# Patient Record
Sex: MALE | Race: BLACK OR AFRICAN AMERICAN | NOT HISPANIC OR LATINO | URBAN - METROPOLITAN AREA
[De-identification: names, ages, dates, MRNs, and addresses within clinical notes are randomized per-mention and may not be internally consistent; named-entity substitution may affect disease eponyms.]

---

## 2024-04-15 ENCOUNTER — APPOINTMENT (OUTPATIENT)
Dept: NEUROLOGY | Facility: CLINIC | Age: 54
End: 2024-04-15

## 2024-07-03 ENCOUNTER — HOSPITAL ENCOUNTER (EMERGENCY)
Facility: HOSPITAL | Age: 54
Discharge: HOME | End: 2024-07-03
Attending: STUDENT IN AN ORGANIZED HEALTH CARE EDUCATION/TRAINING PROGRAM
Payer: COMMERCIAL

## 2024-07-03 VITALS
OXYGEN SATURATION: 97 % | DIASTOLIC BLOOD PRESSURE: 86 MMHG | WEIGHT: 220 LBS | RESPIRATION RATE: 18 BRPM | SYSTOLIC BLOOD PRESSURE: 137 MMHG | BODY MASS INDEX: 25.98 KG/M2 | TEMPERATURE: 97.7 F | HEIGHT: 77 IN | HEART RATE: 78 BPM

## 2024-07-03 DIAGNOSIS — F19.90 SUBSTANCE USE: Primary | ICD-10-CM

## 2024-07-03 LAB — GLUCOSE BLD MANUAL STRIP-MCNC: 127 MG/DL (ref 74–99)

## 2024-07-03 PROCEDURE — 99284 EMERGENCY DEPT VISIT MOD MDM: CPT | Performed by: STUDENT IN AN ORGANIZED HEALTH CARE EDUCATION/TRAINING PROGRAM

## 2024-07-03 PROCEDURE — 2500000001 HC RX 250 WO HCPCS SELF ADMINISTERED DRUGS (ALT 637 FOR MEDICARE OP): Mod: SE | Performed by: NURSE PRACTITIONER

## 2024-07-03 PROCEDURE — 82947 ASSAY GLUCOSE BLOOD QUANT: CPT

## 2024-07-03 PROCEDURE — 2500000002 HC RX 250 W HCPCS SELF ADMINISTERED DRUGS (ALT 637 FOR MEDICARE OP, ALT 636 FOR OP/ED)

## 2024-07-03 PROCEDURE — 99283 EMERGENCY DEPT VISIT LOW MDM: CPT

## 2024-07-03 RX ORDER — IBUPROFEN 600 MG/1
600 TABLET ORAL ONCE
Status: COMPLETED | OUTPATIENT
Start: 2024-07-03 | End: 2024-07-03

## 2024-07-03 RX ORDER — DIAZEPAM 5 MG/1
5 TABLET ORAL ONCE
Status: COMPLETED | OUTPATIENT
Start: 2024-07-03 | End: 2024-07-03

## 2024-07-03 RX ADMIN — IBUPROFEN 600 MG: 600 TABLET ORAL at 11:15

## 2024-07-03 RX ADMIN — DIAZEPAM 5 MG: 5 TABLET ORAL at 05:51

## 2024-07-03 ASSESSMENT — LIFESTYLE VARIABLES
HAVE PEOPLE ANNOYED YOU BY CRITICIZING YOUR DRINKING: NO
TOTAL SCORE: 0
HAVE YOU EVER FELT YOU SHOULD CUT DOWN ON YOUR DRINKING: NO
EVER HAD A DRINK FIRST THING IN THE MORNING TO STEADY YOUR NERVES TO GET RID OF A HANGOVER: NO
EVER FELT BAD OR GUILTY ABOUT YOUR DRINKING: NO

## 2024-07-03 ASSESSMENT — PAIN - FUNCTIONAL ASSESSMENT: PAIN_FUNCTIONAL_ASSESSMENT: 0-10

## 2024-07-03 ASSESSMENT — COLUMBIA-SUICIDE SEVERITY RATING SCALE - C-SSRS
6. HAVE YOU EVER DONE ANYTHING, STARTED TO DO ANYTHING, OR PREPARED TO DO ANYTHING TO END YOUR LIFE?: NO
1. IN THE PAST MONTH, HAVE YOU WISHED YOU WERE DEAD OR WISHED YOU COULD GO TO SLEEP AND NOT WAKE UP?: NO
2. HAVE YOU ACTUALLY HAD ANY THOUGHTS OF KILLING YOURSELF?: NO

## 2024-07-03 ASSESSMENT — PAIN SCALES - GENERAL: PAINLEVEL_OUTOF10: 3

## 2024-07-03 ASSESSMENT — PAIN DESCRIPTION - PROGRESSION: CLINICAL_PROGRESSION: NOT CHANGED

## 2024-07-03 NOTE — PROGRESS NOTES
"Patient was handed off to me from the previous team. For full history, physical, and prior ED course, please see previous provider note prior to patient handoff. This is an addendum to the record.    This is a 54 year old male who was a sign out to me pending reassessment.   On reassessment, the patient is awake and alert. Denies HI/SI and does not endorse auditory or visual hallucinations. States that he does not quite feel well \"inside\" but does not elaborate on that.  We talked about his drug use and he is interested in [facility]. He was then seen by [facility] who secured and inpatient rehab facility but first he is going to go to his brother's house to grab clothing.   He is however discharged from the ED.   I spoke with Dr. Whitlock regarding his discharge planning.     Hospital Course/MDM:  Please see the original ED provider note,     Disposition:  Discharge     Yumiko Soriano CNP  Emergency Medicine     "

## 2024-07-03 NOTE — ED PROVIDER NOTES
CC: Hallucinations     HPI:  Patient is a 54-year-old male with a past medical history of T2DM who presented to the ED for hallucinations after crack use.  Patient is noting auditory hallucinations.  Patient will not further discuss what his hallucinations in regards to.  Denies SI and HI.  Patient notes that he smokes crack every other day.  Patient denies having any other symptoms.  Denied fevers, chills, nausea, vomiting, chest pain, difficulty breathing, headache, abdominal pain, back pain, trauma, and falls.    Limitations to history: Intoxication  Independent historian(s): Patient  Records Reviewed: Recent available ED and inpatient notes reviewed in EMR.    PMHx/PSHx:  Per HPI.   - has a past medical history of Dyshidrosis (pompholyx) (02/20/2015), Pain in left shoulder (02/20/2015), and Sleep deprivation (02/20/2015).  - has a past surgical history that includes Other surgical history (08/18/2015).    Medications:  Reviewed in EMR. See EMR for complete list of medications and doses.    Allergies:  Latex    Social History:  - Tobacco:  has no history on file for tobacco use.   - Alcohol:  has no history on file for alcohol use.   - Illicit Drugs:  has no history on file for drug use.     ROS:  Per HPI.       ???????????????????????????????????????????????????????????????  Triage Vitals:  T 36.7 °C (98.1 °F)  HR 89  /90  RR 18  O2 99 %      Physical Exam  Vitals and nursing note reviewed.   Constitutional:       General: He is not in acute distress.  HENT:      Head: Normocephalic and atraumatic.      Nose: Nose normal.      Mouth/Throat:      Mouth: Mucous membranes are moist.   Eyes:      Conjunctiva/sclera: Conjunctivae normal.   Cardiovascular:      Rate and Rhythm: Normal rate and regular rhythm.      Pulses: Normal pulses.   Pulmonary:      Effort: Pulmonary effort is normal. No respiratory distress.      Breath sounds: Normal breath sounds.   Abdominal:      Palpations: Abdomen is soft.       Tenderness: There is no abdominal tenderness.   Musculoskeletal:         General: No tenderness.   Skin:     General: Skin is warm.   Neurological:      General: No focal deficit present.      Mental Status: He is alert and oriented to person, place, and time.      Cranial Nerves: No cranial nerve deficit.   Psychiatric:      Comments: Notes auditory hallucinations.  Denies visual hallucinations, SI, and HI.  Patient notes feeling paranoid.           ???????????????????????????????????????????????????????????????  Labs:   Labs Reviewed   POCT GLUCOSE METER        Imaging:   No orders to display          MDM:  Patient is a 54-year-old male with a past medical history of T2DM who presented to the ED for hallucinations after crack use.  Patient is HDS.  Physical exam finding significant for auditory hallucinations.  Low clinical concern for nonsubstance-induced etiology of the patient's presentation including traumatic, infectious, metabolic, and neurologic.  Patient ordered point-of-care glucose and Valium.  Patient tolerated p.o.    ED Course:  ED Course as of 07/03/24 0604   Wed Jul 03, 2024   0603 POCT Glucose(!): 127 [MH]      ED Course User Index  [MH] Ashvin Sanchez MD         Diagnoses as of 07/03/24 0604   Substance use       Social Determinants Limiting Care:  None identified    Disposition:  Patient signed out to oncoming provider pending sober reevaluation.    Ashvin Sanchez MD   Emergency Medicine PGY-2  [facility]    Comment: Please note this report has been produced using speech recognition software and may contain errors related to that system including errors in grammar, punctuation, and spelling as well as words and phrases that may be inappropriate.  If there are any questions or concerns please feel free to contact the dictating provider for clarification.    Procedures ? SmartLinks last updated 7/3/2024 5:46 AM        Ashvin Sanchez MD  Resident  07/03/24 0604

## 2024-07-04 ENCOUNTER — APPOINTMENT (OUTPATIENT)
Dept: RADIOLOGY | Facility: HOSPITAL | Age: 54
End: 2024-07-04
Payer: COMMERCIAL

## 2024-07-04 ENCOUNTER — HOSPITAL ENCOUNTER (OUTPATIENT)
Facility: HOSPITAL | Age: 54
Setting detail: OBSERVATION
LOS: 1 days | Discharge: HOME | End: 2024-07-05
Attending: STUDENT IN AN ORGANIZED HEALTH CARE EDUCATION/TRAINING PROGRAM | Admitting: STUDENT IN AN ORGANIZED HEALTH CARE EDUCATION/TRAINING PROGRAM
Payer: COMMERCIAL

## 2024-07-04 DIAGNOSIS — E78.49 OTHER HYPERLIPIDEMIA: ICD-10-CM

## 2024-07-04 DIAGNOSIS — R07.9 ACUTE CHEST PAIN: Primary | ICD-10-CM

## 2024-07-04 DIAGNOSIS — I15.9 SECONDARY HYPERTENSION: ICD-10-CM

## 2024-07-04 DIAGNOSIS — G62.9 POLYNEUROPATHY: ICD-10-CM

## 2024-07-04 LAB
ALBUMIN SERPL BCP-MCNC: 4.3 G/DL (ref 3.4–5)
ALP SERPL-CCNC: 45 U/L (ref 33–120)
ALT SERPL W P-5'-P-CCNC: 24 U/L (ref 10–52)
ANION GAP SERPL CALC-SCNC: 13 MMOL/L (ref 10–20)
AST SERPL W P-5'-P-CCNC: 33 U/L (ref 9–39)
ATRIAL RATE: 89 BPM
BASOPHILS # BLD AUTO: 0.04 X10*3/UL (ref 0–0.1)
BASOPHILS NFR BLD AUTO: 0.5 %
BILIRUB SERPL-MCNC: 0.4 MG/DL (ref 0–1.2)
BUN SERPL-MCNC: 14 MG/DL (ref 6–23)
CALCIUM SERPL-MCNC: 9.4 MG/DL (ref 8.6–10.6)
CARDIAC TROPONIN I PNL SERPL HS: 11 NG/L (ref 0–53)
CARDIAC TROPONIN I PNL SERPL HS: 12 NG/L (ref 0–53)
CHLORIDE SERPL-SCNC: 102 MMOL/L (ref 98–107)
CHOLEST SERPL-MCNC: 167 MG/DL (ref 0–199)
CHOLESTEROL/HDL RATIO: 2.6
CO2 SERPL-SCNC: 30 MMOL/L (ref 21–32)
CREAT SERPL-MCNC: 1.51 MG/DL (ref 0.5–1.3)
EGFRCR SERPLBLD CKD-EPI 2021: 55 ML/MIN/1.73M*2
EOSINOPHIL # BLD AUTO: 0.06 X10*3/UL (ref 0–0.7)
EOSINOPHIL NFR BLD AUTO: 0.7 %
ERYTHROCYTE [DISTWIDTH] IN BLOOD BY AUTOMATED COUNT: 12.4 % (ref 11.5–14.5)
EST. AVERAGE GLUCOSE BLD GHB EST-MCNC: 146 MG/DL
GLUCOSE SERPL-MCNC: 94 MG/DL (ref 74–99)
HBA1C MFR BLD: 6.7 %
HCT VFR BLD AUTO: 34.9 % (ref 41–52)
HDLC SERPL-MCNC: 64.6 MG/DL
HGB BLD-MCNC: 12.6 G/DL (ref 13.5–17.5)
IMM GRANULOCYTES # BLD AUTO: 0.02 X10*3/UL (ref 0–0.7)
IMM GRANULOCYTES NFR BLD AUTO: 0.2 % (ref 0–0.9)
LDLC SERPL CALC-MCNC: 86 MG/DL
LYMPHOCYTES # BLD AUTO: 2.49 X10*3/UL (ref 1.2–4.8)
LYMPHOCYTES NFR BLD AUTO: 29.5 %
MAGNESIUM SERPL-MCNC: 1.87 MG/DL (ref 1.6–2.4)
MCH RBC QN AUTO: 32.4 PG (ref 26–34)
MCHC RBC AUTO-ENTMCNC: 36.1 G/DL (ref 32–36)
MCV RBC AUTO: 90 FL (ref 80–100)
MONOCYTES # BLD AUTO: 0.73 X10*3/UL (ref 0.1–1)
MONOCYTES NFR BLD AUTO: 8.7 %
NEUTROPHILS # BLD AUTO: 5.09 X10*3/UL (ref 1.2–7.7)
NEUTROPHILS NFR BLD AUTO: 60.4 %
NON HDL CHOLESTEROL: 102 MG/DL (ref 0–149)
NRBC BLD-RTO: 0 /100 WBCS (ref 0–0)
P AXIS: 74 DEGREES
P OFFSET: 204 MS
P ONSET: 143 MS
PLATELET # BLD AUTO: 235 X10*3/UL (ref 150–450)
POTASSIUM SERPL-SCNC: 3.8 MMOL/L (ref 3.5–5.3)
PR INTERVAL: 152 MS
PROT SERPL-MCNC: 6.7 G/DL (ref 6.4–8.2)
Q ONSET: 219 MS
QRS COUNT: 15 BEATS
QRS DURATION: 82 MS
QT INTERVAL: 358 MS
QTC CALCULATION(BAZETT): 435 MS
QTC FREDERICIA: 408 MS
R AXIS: 56 DEGREES
RBC # BLD AUTO: 3.89 X10*6/UL (ref 4.5–5.9)
SODIUM SERPL-SCNC: 141 MMOL/L (ref 136–145)
T AXIS: 41 DEGREES
T OFFSET: 398 MS
TRIGL SERPL-MCNC: 84 MG/DL (ref 0–149)
VENTRICULAR RATE: 89 BPM
VLDL: 17 MG/DL (ref 0–40)
WBC # BLD AUTO: 8.4 X10*3/UL (ref 4.4–11.3)

## 2024-07-04 PROCEDURE — 71046 X-RAY EXAM CHEST 2 VIEWS: CPT | Performed by: SURGERY

## 2024-07-04 PROCEDURE — 2500000001 HC RX 250 WO HCPCS SELF ADMINISTERED DRUGS (ALT 637 FOR MEDICARE OP): Mod: SE | Performed by: STUDENT IN AN ORGANIZED HEALTH CARE EDUCATION/TRAINING PROGRAM

## 2024-07-04 PROCEDURE — 99222 1ST HOSP IP/OBS MODERATE 55: CPT | Performed by: STUDENT IN AN ORGANIZED HEALTH CARE EDUCATION/TRAINING PROGRAM

## 2024-07-04 PROCEDURE — 93005 ELECTROCARDIOGRAM TRACING: CPT

## 2024-07-04 PROCEDURE — 80061 LIPID PANEL: CPT | Performed by: STUDENT IN AN ORGANIZED HEALTH CARE EDUCATION/TRAINING PROGRAM

## 2024-07-04 PROCEDURE — 80053 COMPREHEN METABOLIC PANEL: CPT | Performed by: STUDENT IN AN ORGANIZED HEALTH CARE EDUCATION/TRAINING PROGRAM

## 2024-07-04 PROCEDURE — 36415 COLL VENOUS BLD VENIPUNCTURE: CPT | Performed by: STUDENT IN AN ORGANIZED HEALTH CARE EDUCATION/TRAINING PROGRAM

## 2024-07-04 PROCEDURE — 84484 ASSAY OF TROPONIN QUANT: CPT | Performed by: STUDENT IN AN ORGANIZED HEALTH CARE EDUCATION/TRAINING PROGRAM

## 2024-07-04 PROCEDURE — 2500000004 HC RX 250 GENERAL PHARMACY W/ HCPCS (ALT 636 FOR OP/ED): Mod: SE | Performed by: STUDENT IN AN ORGANIZED HEALTH CARE EDUCATION/TRAINING PROGRAM

## 2024-07-04 PROCEDURE — 99285 EMERGENCY DEPT VISIT HI MDM: CPT | Mod: 25

## 2024-07-04 PROCEDURE — 83735 ASSAY OF MAGNESIUM: CPT | Performed by: STUDENT IN AN ORGANIZED HEALTH CARE EDUCATION/TRAINING PROGRAM

## 2024-07-04 PROCEDURE — 85025 COMPLETE CBC W/AUTO DIFF WBC: CPT | Performed by: STUDENT IN AN ORGANIZED HEALTH CARE EDUCATION/TRAINING PROGRAM

## 2024-07-04 PROCEDURE — 71046 X-RAY EXAM CHEST 2 VIEWS: CPT

## 2024-07-04 PROCEDURE — 2500000001 HC RX 250 WO HCPCS SELF ADMINISTERED DRUGS (ALT 637 FOR MEDICARE OP): Performed by: STUDENT IN AN ORGANIZED HEALTH CARE EDUCATION/TRAINING PROGRAM

## 2024-07-04 PROCEDURE — 1200000002 HC GENERAL ROOM WITH TELEMETRY DAILY

## 2024-07-04 PROCEDURE — 96374 THER/PROPH/DIAG INJ IV PUSH: CPT | Performed by: STUDENT IN AN ORGANIZED HEALTH CARE EDUCATION/TRAINING PROGRAM

## 2024-07-04 PROCEDURE — 83036 HEMOGLOBIN GLYCOSYLATED A1C: CPT | Performed by: STUDENT IN AN ORGANIZED HEALTH CARE EDUCATION/TRAINING PROGRAM

## 2024-07-04 RX ORDER — ACETAMINOPHEN 325 MG/1
650 TABLET ORAL EVERY 4 HOURS PRN
Status: DISCONTINUED | OUTPATIENT
Start: 2024-07-04 | End: 2024-07-05 | Stop reason: HOSPADM

## 2024-07-04 RX ORDER — ONDANSETRON 4 MG/1
4 TABLET, FILM COATED ORAL EVERY 8 HOURS PRN
Status: DISCONTINUED | OUTPATIENT
Start: 2024-07-04 | End: 2024-07-05 | Stop reason: HOSPADM

## 2024-07-04 RX ORDER — KETOROLAC TROMETHAMINE 15 MG/ML
15 INJECTION, SOLUTION INTRAMUSCULAR; INTRAVENOUS ONCE
Status: COMPLETED | OUTPATIENT
Start: 2024-07-04 | End: 2024-07-04

## 2024-07-04 RX ORDER — POLYETHYLENE GLYCOL 3350 17 G/17G
17 POWDER, FOR SOLUTION ORAL DAILY
Status: DISCONTINUED | OUTPATIENT
Start: 2024-07-04 | End: 2024-07-05 | Stop reason: HOSPADM

## 2024-07-04 RX ORDER — MORPHINE SULFATE 4 MG/ML
2 INJECTION INTRAVENOUS EVERY 4 HOURS PRN
Status: DISCONTINUED | OUTPATIENT
Start: 2024-07-04 | End: 2024-07-05 | Stop reason: HOSPADM

## 2024-07-04 RX ORDER — ASPIRIN 325 MG
325 TABLET ORAL ONCE
Status: COMPLETED | OUTPATIENT
Start: 2024-07-04 | End: 2024-07-04

## 2024-07-04 RX ORDER — ACETAMINOPHEN 650 MG/1
650 SUPPOSITORY RECTAL EVERY 4 HOURS PRN
Status: DISCONTINUED | OUTPATIENT
Start: 2024-07-04 | End: 2024-07-05 | Stop reason: HOSPADM

## 2024-07-04 RX ORDER — IBUPROFEN 400 MG/1
400 TABLET ORAL EVERY 4 HOURS PRN
Status: DISCONTINUED | OUTPATIENT
Start: 2024-07-04 | End: 2024-07-05 | Stop reason: HOSPADM

## 2024-07-04 RX ORDER — NITROGLYCERIN 0.4 MG/1
0.4 TABLET SUBLINGUAL EVERY 5 MIN PRN
Status: DISCONTINUED | OUTPATIENT
Start: 2024-07-04 | End: 2024-07-05 | Stop reason: HOSPADM

## 2024-07-04 RX ORDER — ACETAMINOPHEN 160 MG/5ML
650 SOLUTION ORAL EVERY 4 HOURS PRN
Status: DISCONTINUED | OUTPATIENT
Start: 2024-07-04 | End: 2024-07-05 | Stop reason: HOSPADM

## 2024-07-04 RX ORDER — ONDANSETRON HYDROCHLORIDE 2 MG/ML
4 INJECTION, SOLUTION INTRAVENOUS EVERY 8 HOURS PRN
Status: DISCONTINUED | OUTPATIENT
Start: 2024-07-04 | End: 2024-07-05 | Stop reason: HOSPADM

## 2024-07-04 SDOH — ECONOMIC STABILITY: INCOME INSECURITY: HOW HARD IS IT FOR YOU TO PAY FOR THE VERY BASICS LIKE FOOD, HOUSING, MEDICAL CARE, AND HEATING?: HARD

## 2024-07-04 SDOH — HEALTH STABILITY: MENTAL HEALTH
HOW OFTEN DO YOU NEED TO HAVE SOMEONE HELP YOU WHEN YOU READ INSTRUCTIONS, PAMPHLETS, OR OTHER WRITTEN MATERIAL FROM YOUR DOCTOR OR PHARMACY?: RARELY

## 2024-07-04 SDOH — ECONOMIC STABILITY: INCOME INSECURITY: IN THE LAST 12 MONTHS, WAS THERE A TIME WHEN YOU WERE NOT ABLE TO PAY THE MORTGAGE OR RENT ON TIME?: YES

## 2024-07-04 SDOH — ECONOMIC STABILITY: HOUSING INSECURITY
IN THE LAST 12 MONTHS, WAS THERE A TIME WHEN YOU DID NOT HAVE A STEADY PLACE TO SLEEP OR SLEPT IN A SHELTER (INCLUDING NOW)?: YES

## 2024-07-04 SDOH — ECONOMIC STABILITY: HOUSING INSECURITY: IN THE LAST 12 MONTHS, HOW MANY PLACES HAVE YOU LIVED?: 5

## 2024-07-04 SDOH — SOCIAL STABILITY: SOCIAL INSECURITY: DO YOU FEEL ANYONE HAS EXPLOITED OR TAKEN ADVANTAGE OF YOU FINANCIALLY OR OF YOUR PERSONAL PROPERTY?: NO

## 2024-07-04 SDOH — SOCIAL STABILITY: SOCIAL INSECURITY: DO YOU FEEL UNSAFE GOING BACK TO THE PLACE WHERE YOU ARE LIVING?: YES

## 2024-07-04 SDOH — SOCIAL STABILITY: SOCIAL INSECURITY: DOES ANYONE TRY TO KEEP YOU FROM HAVING/CONTACTING OTHER FRIENDS OR DOING THINGS OUTSIDE YOUR HOME?: NO

## 2024-07-04 SDOH — ECONOMIC STABILITY: TRANSPORTATION INSECURITY
IN THE PAST 12 MONTHS, HAS LACK OF TRANSPORTATION KEPT YOU FROM MEETINGS, WORK, OR FROM GETTING THINGS NEEDED FOR DAILY LIVING?: YES

## 2024-07-04 SDOH — HEALTH STABILITY: PHYSICAL HEALTH: ON AVERAGE, HOW MANY DAYS PER WEEK DO YOU ENGAGE IN MODERATE TO STRENUOUS EXERCISE (LIKE A BRISK WALK)?: 7 DAYS

## 2024-07-04 SDOH — SOCIAL STABILITY: SOCIAL INSECURITY: HAVE YOU HAD ANY THOUGHTS OF HARMING ANYONE ELSE?: NO

## 2024-07-04 SDOH — SOCIAL STABILITY: SOCIAL INSECURITY: HAS ANYONE EVER THREATENED TO HURT YOUR FAMILY OR YOUR PETS?: NO

## 2024-07-04 SDOH — SOCIAL STABILITY: SOCIAL INSECURITY: ARE THERE ANY APPARENT SIGNS OF INJURIES/BEHAVIORS THAT COULD BE RELATED TO ABUSE/NEGLECT?: NO

## 2024-07-04 SDOH — HEALTH STABILITY: PHYSICAL HEALTH: ON AVERAGE, HOW MANY MINUTES DO YOU ENGAGE IN EXERCISE AT THIS LEVEL?: 30 MIN

## 2024-07-04 SDOH — SOCIAL STABILITY: SOCIAL INSECURITY: HAVE YOU HAD THOUGHTS OF HARMING ANYONE ELSE?: NO

## 2024-07-04 SDOH — SOCIAL STABILITY: SOCIAL INSECURITY: ARE YOU OR HAVE YOU BEEN THREATENED OR ABUSED PHYSICALLY, EMOTIONALLY, OR SEXUALLY BY ANYONE?: NO

## 2024-07-04 SDOH — SOCIAL STABILITY: SOCIAL INSECURITY: ABUSE: ADULT

## 2024-07-04 SDOH — ECONOMIC STABILITY: TRANSPORTATION INSECURITY
IN THE PAST 12 MONTHS, HAS THE LACK OF TRANSPORTATION KEPT YOU FROM MEDICAL APPOINTMENTS OR FROM GETTING MEDICATIONS?: YES

## 2024-07-04 ASSESSMENT — ACTIVITIES OF DAILY LIVING (ADL)
FEEDING YOURSELF: INDEPENDENT
TOILETING: INDEPENDENT
DRESSING YOURSELF: INDEPENDENT
WALKS IN HOME: INDEPENDENT
ADEQUATE_TO_COMPLETE_ADL: YES
PATIENT'S MEMORY ADEQUATE TO SAFELY COMPLETE DAILY ACTIVITIES?: YES
JUDGMENT_ADEQUATE_SAFELY_COMPLETE_DAILY_ACTIVITIES: YES
LACK_OF_TRANSPORTATION: YES
BATHING: INDEPENDENT
HEARING - RIGHT EAR: FUNCTIONAL
HEARING - LEFT EAR: FUNCTIONAL
GROOMING: INDEPENDENT

## 2024-07-04 ASSESSMENT — LIFESTYLE VARIABLES
HOW OFTEN DO YOU HAVE A DRINK CONTAINING ALCOHOL: 4 OR MORE TIMES A WEEK
AUDIT TOTAL SCORE: 5
HOW OFTEN DURING THE LAST YEAR HAVE YOU HAD A FEELING OF GUILT OR REMORSE AFTER DRINKING: LESS THAN MONTHLY
HAVE YOU OR SOMEONE ELSE BEEN INJURED AS A RESULT OF YOUR DRINKING: NO
HOW OFTEN DURING THE LAST YEAR HAVE YOU FOUND THAT YOU WERE NOT ABLE TO STOP DRINKING ONCE YOU HAD STARTED: NEVER
AUDIT-C TOTAL SCORE: 5
SUBSTANCE_ABUSE_PAST_12_MONTHS: YES
AUDIT-C TOTAL SCORE: 5
HOW OFTEN DURING THE LAST YEAR HAVE YOU NEEDED AN ALCOHOLIC DRINK FIRST THING IN THE MORNING TO GET YOURSELF GOING AFTER A NIGHT OF HEAVY DRINKING: NEVER
AUDIT TOTAL SCORE: 10
HOW OFTEN DURING THE LAST YEAR HAVE YOU BEEN UNABLE TO REMEMBER WHAT HAPPENED THE NIGHT BEFORE BECAUSE YOU HAD BEEN DRINKING: NEVER
HAS A RELATIVE, FRIEND, DOCTOR, OR ANOTHER HEALTH PROFESSIONAL EXPRESSED CONCERN ABOUT YOUR DRINKING OR SUGGESTED YOU CUT DOWN: YES, DURING THE LAST YEAR
HOW OFTEN DO YOU HAVE 6 OR MORE DRINKS ON ONE OCCASION: LESS THAN MONTHLY
SKIP TO QUESTIONS 9-10: 0
PRESCIPTION_ABUSE_PAST_12_MONTHS: NO
HOW OFTEN DURING THE LAST YEAR HAVE YOU FAILED TO DO WHAT WAS NORMALLY EXPECTED FROM YOU BECAUSE OF DRINKING: NEVER
HOW MANY STANDARD DRINKS CONTAINING ALCOHOL DO YOU HAVE ON A TYPICAL DAY: 1 OR 2

## 2024-07-04 ASSESSMENT — PAIN DESCRIPTION - LOCATION: LOCATION: CHEST

## 2024-07-04 ASSESSMENT — COGNITIVE AND FUNCTIONAL STATUS - GENERAL
PATIENT BASELINE BEDBOUND: NO
DAILY ACTIVITIY SCORE: 24
MOBILITY SCORE: 24

## 2024-07-04 ASSESSMENT — PAIN SCALES - WONG BAKER: WONGBAKER_NUMERICALRESPONSE: HURTS LITTLE BIT

## 2024-07-04 ASSESSMENT — COLUMBIA-SUICIDE SEVERITY RATING SCALE - C-SSRS
1. IN THE PAST MONTH, HAVE YOU WISHED YOU WERE DEAD OR WISHED YOU COULD GO TO SLEEP AND NOT WAKE UP?: NO
6. HAVE YOU EVER DONE ANYTHING, STARTED TO DO ANYTHING, OR PREPARED TO DO ANYTHING TO END YOUR LIFE?: NO
2. HAVE YOU ACTUALLY HAD ANY THOUGHTS OF KILLING YOURSELF?: NO

## 2024-07-04 ASSESSMENT — PATIENT HEALTH QUESTIONNAIRE - PHQ9
SUM OF ALL RESPONSES TO PHQ9 QUESTIONS 1 & 2: 2
1. LITTLE INTEREST OR PLEASURE IN DOING THINGS: SEVERAL DAYS
2. FEELING DOWN, DEPRESSED OR HOPELESS: SEVERAL DAYS

## 2024-07-04 ASSESSMENT — PAIN - FUNCTIONAL ASSESSMENT: PAIN_FUNCTIONAL_ASSESSMENT: 0-10

## 2024-07-04 ASSESSMENT — PAIN SCALES - GENERAL
PAINLEVEL_OUTOF10: 7
PAINLEVEL_OUTOF10: 5 - MODERATE PAIN
PAINLEVEL_OUTOF10: 5 - MODERATE PAIN

## 2024-07-04 ASSESSMENT — PAIN DESCRIPTION - DESCRIPTORS: DESCRIPTORS: CRAMPING;ACHING

## 2024-07-04 NOTE — H&P
INTERNAL MEDICINE HISTORY AND PHYSICALS (H&P)      History Of Present Illness     Ernestine Figueroa is a 54 y.o. male history of polysubstance abuse including marijuana cocaine, tobacco use disorder, chronic noncompliance, diabetes, polyneuropathy, hypertension, hyperlipidemia dyshidrosis, sleep deprivation, who presented to the ED with chest pain.  Patient reports that pain started around 10 PM last night, described as a tightness, 10/10 in intensity, substernal, nonradiating, associated with shortness of breath, denies any alleviating or aggravating factor.  Patient denies any nausea or vomiting, also denies headache or diaphoresis.  The patient reportedly had similar symptoms several years ago and underwent negative cardiac workup.  Patient has a history of polysubstance abuse, he reported his last cocaine use was yesterday.  He is a 40-pack-year smoker  The ED, patient was hemodynamically stable with /74, pulse 72, respiratory rate 14.  CBC showed mild anemia with Hgb 12.6, normal leukocytosis WBC 8.4.  Renal chemistry was only significant for mild LUCERO with creatinine 1.51.  Troponin was negative at 12 and then 11, EKG was negative for any acute ST changes.  Patient received high-dose aspirin 325 mg once, he was also given IV Toradol 15 mg 1x.  Patient will be admitted to the medicine team for further management  Patient seen and examined today, he was resting comfortably in bed in nonapparent distress.  Patient is still complaining of mild pressure-like pain during my examination.  The pain was was not reproducible.  Neuro, cardiac, musculoskeletal exam were all unremarkable     Past Medical History  Past Medical History:   Diagnosis Date    DM (diabetes mellitus)  "(Multi)     Dyshidrosis (pompholyx) 02/20/2015    Dyshidrotic eczema    Pain in left shoulder 02/20/2015    Left shoulder pain    Sleep deprivation 02/20/2015    Sleep deficient       Surgical History  Past Surgical History:   Procedure Laterality Date    OTHER SURGICAL HISTORY  08/18/2015    Prior Surgical Procedure Not Done        Social History  He has no history on file for tobacco use, alcohol use, and drug use.    Family History  No family history on file.     Allergies  Latex    Review of Systems   Complete reviews of the systems were performed with pertinent positives and negatives as mentioned in the HPI, the rest of ROS are negative    Physical Exam  HENT:      Head: Normocephalic and atraumatic.      Mouth/Throat:      Mouth: Mucous membranes are moist.   Eyes:      Pupils: Pupils are equal, round, and reactive to light.   Cardiovascular:      Rate and Rhythm: Normal rate.   Pulmonary:      Breath sounds: Normal breath sounds.   Abdominal:      Palpations: Abdomen is soft.   Musculoskeletal:         General: Normal range of motion.      Cervical back: Normal range of motion.   Skin:     General: Skin is warm.      Capillary Refill: Capillary refill takes less than 2 seconds.   Neurological:      General: No focal deficit present.      Mental Status: He is alert.   Psychiatric:         Mood and Affect: Mood normal.          Last Recorded Vitals  Blood pressure 125/74, pulse 72, temperature 36.4 °C (97.6 °F), resp. rate 18, height 1.956 m (6' 5\"), weight 99.8 kg (220 lb), SpO2 (!) 93%.    Relevant Results (Labs and Imaging)        WBC   Date Value Ref Range Status   07/04/2024 8.4 4.4 - 11.3 x10*3/uL Final     Hemoglobin   Date Value Ref Range Status   07/04/2024 12.6 (L) 13.5 - 17.5 g/dL Final     Hematocrit   Date Value Ref Range Status   07/04/2024 34.9 (L) 41.0 - 52.0 % Final     Bicarbonate   Date Value Ref Range Status   07/04/2024 30 21 - 32 mmol/L Final     Creatinine   Date Value Ref Range Status "   07/04/2024 1.51 (H) 0.50 - 1.30 mg/dL Final     Calcium   Date Value Ref Range Status   07/04/2024 9.4 8.6 - 10.6 mg/dL Final       XR chest 2 views  Narrative: Interpreted By:  Nathaniel Huynh and Sheng Max   STUDY:  XR CHEST 2 VIEWS;  7/4/2024 4:24 am      INDICATION:  Signs/Symptoms:chest pain.      COMPARISON:  Chest radiograph dated 09/19/2019      ACCESSION NUMBER(S):  [id number]      ORDERING CLINICIAN:  WINTER WHITE      FINDINGS:  PA and lateral radiograph of the chest:          CARDIOMEDIASTINAL SILHOUETTE:  The cardiomediastinal silhouette is normal in size and configuration.      LUNGS:  No focal consolidation or sizeable pleural effusion is identified. No  pneumothorax is seen.      ABDOMEN:  No remarkable upper abdominal findings.      BONES:  No acute osseous abnormality.      Impression: 1. No evidence of acute cardiopulmonary process.          I personally reviewed the images/study and I agree with the findings  as stated by Dr. Manuel Mcknight. This study was interpreted at [place], [place].      MACRO:  None      Signed by: Nathaniel Huynh 7/4/2024 4:33 AM  Dictation workstation:   [id number]  ECG 12 lead  Normal sinus rhythm  Normal ECG  When compared with ECG of 19-MAR-1997 11:21,  PREVIOUS ECG IS PRESENT  Confirmed by Johnnie Houser ([id number]) on 7/4/2024 1:42:44 AM       Assessment/Plan   Principal Problem:    Acute chest pain    > Typical/atypical chest pain  -Troponin negative 12 > 11  -EKG also negative for acute ST changes  -Labs and initial imaging are not confirmatory for cardiac origin of the chest pain.  However, provided patient history of cocaine abuse and self-reported diabetes and hypertension, and noncompliance, additional cardiac workup will be recommended  -Will obtain TTE, might consider stress test  -UDS pending  -Keep on telemetry    > Polysubstance abuse including cocaine and tobacco  -Follow UDS  -Patient consult regarding  cessation    > LUCERO, likely prerenal   - Cr 1.51 on admission   - cont to follow     > History of diabetes, hypertension and hyperlipidemia  -Will obtain A1c and lipid panel  -No home meds listed    > Polyneuropathy, most likely secondary to uncontrolled diabetes  -Patient reported taking gabapentin at home, will resume once reconciled       I spent 55 minutes in the professional and overall care of this patient.      Deng Thompson MD

## 2024-07-04 NOTE — ED TRIAGE NOTES
Pt states that he has been having mid sternal chest pain and sob for about an hour, he denies any radiation. Pt states that he was walking when his pain started.

## 2024-07-04 NOTE — ED PROVIDER NOTES
Chief Complaint   Patient presents with    Chest Pain        HPI:  54 y.o. male with a history of diabetes, hypertension, hyperlipidemia presenting to the ED with chest pain.  Patient reports he started having nonradiating left-sided chest pain around 10 PM this evening.  He reports associated shortness of breath as well.  No nausea or vomiting.  No pain in the back neck or upper extremities.  Pain has now significantly lessened though is still present.  Has had similar pain before but did not get this evaluated.  No prior history of known cardiac disease.  No recent travel, surgeries.  No prior history of cancer, blood clots.    History provided by: patient  Limitations to history: none    ------------------------------------------------------------------------------------------------------------------------------------------    Physical Exam:  T 36.3 °C (97.4 °F)  HR 86  /83  RR 16  O2 97 % None (Room air)    Triage vitals reviewed.  Constitutional: Well developed adult appears uncomfortable, non toxic in appearance  Head: Normocephalic, atraumatic  Skin: Intact, dry. No rashes or lesions.  Eyes: Pupils are equal. No conjunctival injection.  Neck: Supple. Trachea is midline.  Pulmonary: Normal work of breathing with no accessory muscle use noted.  Clear to auscultation bilaterally. No wheezing, rhonchi, rales.  Cardiovascular: RRR. 2+ radial pulses bilaterally.   Abdomen: Soft, nondistended. Nontender to palpation.  Extremities: No gross deformities.  Moving all extremities spontaneously without difficulty.  Neuro: Awake and alert. Face is symmetric.  Speech is clear. Full and equal strength of upper and lower extremities bilaterally.  Psych: Appropriate mood and affect.    ------------------------------------------------------------------------------------------------------------------------------------------    ED Course:  ED Course as of 07/22/24 0902   Thu Jul 04, 2024   5743 Chest x-ray interpreted  by me shows no consolidation, pneumothorax, or evidence of interstitial edema. [DR]   0434 EKG interpreted by me.  Sinus rhythm, rate of 89 bpm.  Normal axis.  No acute ST elevations or depressions.  Completed at 0028. []   0648 Patient reevaluated still having chest pain.  Initial troponin negative.  Delta troponin still pending.  Will plan on admitting for cardiac restratification given his heart score of 4. [DR]      ED Course User Index  [] Elda Templeton DO         Diagnoses as of 07/22/24 0902   Acute chest pain        Medical Decision Making:  This patient is a 54 y.o. male with a history of diabetes, hypertension, hyperlipidemia presenting to the ED with chest pain.  On arrival, patient nontoxic-appearing but does appear mildly uncomfortable.  No respiratory distress. Good peripheral pulses throughout, no focal neurodeficits.  Labs, EKG, chest x-ray ordered. ACS is on differential and patient has several risk factors for this.  Lower suspicion for PE based on description of symptoms, lack of risk factors, vital signs, and no evidence of VTE on exam.  Low suspicion as well for aortic dissection given his description of symptoms, similar to prior symptoms as well as exam with no focal neurologic deficits, good peripheral pulses and equally bilaterally, and no evidence of mediastinal widening on chest x-ray.  Additionally no consolidation or pneumothorax seen on chest x-ray.    Clinical Impression:  Chest pain concerning for cardiac etiology    Disposition:  Admit to telemetry    Elda Templeton DO  Emergency Medicine         Elda Templeton DO  07/22/24 0907

## 2024-07-04 NOTE — CARE PLAN
The patient's goals for the shift include will be free from pain.    The clinical goals for the shift include will have no chest pain through out the shift.

## 2024-07-05 ENCOUNTER — PHARMACY VISIT (OUTPATIENT)
Dept: PHARMACY | Facility: CLINIC | Age: 54
End: 2024-07-05
Payer: MEDICAID

## 2024-07-05 ENCOUNTER — APPOINTMENT (OUTPATIENT)
Dept: CARDIOLOGY | Facility: HOSPITAL | Age: 54
End: 2024-07-05
Payer: COMMERCIAL

## 2024-07-05 ENCOUNTER — CLINICAL SUPPORT (OUTPATIENT)
Dept: EMERGENCY MEDICINE | Facility: HOSPITAL | Age: 54
End: 2024-07-05
Payer: COMMERCIAL

## 2024-07-05 VITALS
TEMPERATURE: 98.2 F | DIASTOLIC BLOOD PRESSURE: 84 MMHG | BODY MASS INDEX: 25.98 KG/M2 | HEART RATE: 58 BPM | RESPIRATION RATE: 16 BRPM | WEIGHT: 220 LBS | OXYGEN SATURATION: 96 % | SYSTOLIC BLOOD PRESSURE: 136 MMHG | HEIGHT: 77 IN

## 2024-07-05 LAB
ANION GAP SERPL CALC-SCNC: 14 MMOL/L (ref 10–20)
BUN SERPL-MCNC: 13 MG/DL (ref 6–23)
CALCIUM SERPL-MCNC: 8.7 MG/DL (ref 8.6–10.6)
CHLORIDE SERPL-SCNC: 103 MMOL/L (ref 98–107)
CO2 SERPL-SCNC: 27 MMOL/L (ref 21–32)
CREAT SERPL-MCNC: 1.1 MG/DL (ref 0.5–1.3)
EGFRCR SERPLBLD CKD-EPI 2021: 80 ML/MIN/1.73M*2
EJECTION FRACTION: 63 %
ERYTHROCYTE [DISTWIDTH] IN BLOOD BY AUTOMATED COUNT: 12.7 % (ref 11.5–14.5)
GLUCOSE SERPL-MCNC: 205 MG/DL (ref 74–99)
HCT VFR BLD AUTO: 39.5 % (ref 41–52)
HGB BLD-MCNC: 12.8 G/DL (ref 13.5–17.5)
LEFT ATRIUM VOLUME AREA LENGTH INDEX BSA: 53.8 ML/M2
LEFT VENTRICLE INTERNAL DIMENSION DIASTOLE: 5.6 CM (ref 3.5–6)
MCH RBC QN AUTO: 31 PG (ref 26–34)
MCHC RBC AUTO-ENTMCNC: 32.4 G/DL (ref 32–36)
MCV RBC AUTO: 96 FL (ref 80–100)
NRBC BLD-RTO: 0 /100 WBCS (ref 0–0)
PLATELET # BLD AUTO: 242 X10*3/UL (ref 150–450)
POTASSIUM SERPL-SCNC: 3.9 MMOL/L (ref 3.5–5.3)
RBC # BLD AUTO: 4.13 X10*6/UL (ref 4.5–5.9)
RIGHT VENTRICLE FREE WALL PEAK S': 12.6 CM/S
SODIUM SERPL-SCNC: 140 MMOL/L (ref 136–145)
TRICUSPID ANNULAR PLANE SYSTOLIC EXCURSION: 2.7 CM
WBC # BLD AUTO: 6.7 X10*3/UL (ref 4.4–11.3)

## 2024-07-05 PROCEDURE — 85027 COMPLETE CBC AUTOMATED: CPT | Performed by: STUDENT IN AN ORGANIZED HEALTH CARE EDUCATION/TRAINING PROGRAM

## 2024-07-05 PROCEDURE — 2500000001 HC RX 250 WO HCPCS SELF ADMINISTERED DRUGS (ALT 637 FOR MEDICARE OP): Performed by: STUDENT IN AN ORGANIZED HEALTH CARE EDUCATION/TRAINING PROGRAM

## 2024-07-05 PROCEDURE — RXMED WILLOW AMBULATORY MEDICATION CHARGE

## 2024-07-05 PROCEDURE — 80048 BASIC METABOLIC PNL TOTAL CA: CPT | Performed by: STUDENT IN AN ORGANIZED HEALTH CARE EDUCATION/TRAINING PROGRAM

## 2024-07-05 PROCEDURE — 2500000004 HC RX 250 GENERAL PHARMACY W/ HCPCS (ALT 636 FOR OP/ED): Performed by: STUDENT IN AN ORGANIZED HEALTH CARE EDUCATION/TRAINING PROGRAM

## 2024-07-05 PROCEDURE — 36415 COLL VENOUS BLD VENIPUNCTURE: CPT | Performed by: STUDENT IN AN ORGANIZED HEALTH CARE EDUCATION/TRAINING PROGRAM

## 2024-07-05 PROCEDURE — 93306 TTE W/DOPPLER COMPLETE: CPT

## 2024-07-05 PROCEDURE — G0378 HOSPITAL OBSERVATION PER HR: HCPCS

## 2024-07-05 PROCEDURE — 99239 HOSP IP/OBS DSCHRG MGMT >30: CPT | Performed by: STUDENT IN AN ORGANIZED HEALTH CARE EDUCATION/TRAINING PROGRAM

## 2024-07-05 PROCEDURE — 93306 TTE W/DOPPLER COMPLETE: CPT | Performed by: SPECIALIST

## 2024-07-05 RX ORDER — AMLODIPINE BESYLATE 5 MG/1
5 TABLET ORAL
Qty: 30 TABLET | Refills: 1 | Status: SHIPPED | OUTPATIENT
Start: 2024-07-05 | End: 2024-09-03

## 2024-07-05 RX ORDER — LISINOPRIL 5 MG/1
5 TABLET ORAL DAILY
Qty: 30 TABLET | Refills: 1 | Status: SHIPPED | OUTPATIENT
Start: 2024-07-05 | End: 2024-09-03

## 2024-07-05 RX ORDER — AMLODIPINE BESYLATE 5 MG/1
5 TABLET ORAL DAILY
Status: DISCONTINUED | OUTPATIENT
Start: 2024-07-05 | End: 2024-07-05 | Stop reason: HOSPADM

## 2024-07-05 RX ORDER — AMLODIPINE BESYLATE 5 MG/1
1 TABLET ORAL
Status: ON HOLD | COMMUNITY
Start: 2023-04-28 | End: 2024-07-05

## 2024-07-05 RX ORDER — ATORVASTATIN CALCIUM 40 MG/1
40 TABLET, FILM COATED ORAL DAILY
Status: DISCONTINUED | OUTPATIENT
Start: 2024-07-05 | End: 2024-07-05 | Stop reason: HOSPADM

## 2024-07-05 RX ORDER — GABAPENTIN 300 MG/1
300 CAPSULE ORAL 3 TIMES DAILY
Qty: 90 CAPSULE | Refills: 1 | Status: SHIPPED | OUTPATIENT
Start: 2024-07-05 | End: 2024-09-03

## 2024-07-05 RX ORDER — LISINOPRIL 5 MG/1
TABLET ORAL
Status: ON HOLD | COMMUNITY
Start: 2023-06-03 | End: 2024-07-05

## 2024-07-05 RX ORDER — ATORVASTATIN CALCIUM 40 MG/1
1 TABLET, FILM COATED ORAL
Status: ON HOLD | COMMUNITY
Start: 2023-04-28 | End: 2024-07-05

## 2024-07-05 RX ORDER — ATORVASTATIN CALCIUM 40 MG/1
40 TABLET, FILM COATED ORAL
Qty: 30 TABLET | Refills: 1 | Status: SHIPPED | OUTPATIENT
Start: 2024-07-05 | End: 2024-09-03

## 2024-07-05 ASSESSMENT — PAIN SCALES - GENERAL
PAINLEVEL_OUTOF10: 0 - NO PAIN
PAINLEVEL_OUTOF10: 0 - NO PAIN
PAINLEVEL_OUTOF10: 4

## 2024-07-05 ASSESSMENT — COGNITIVE AND FUNCTIONAL STATUS - GENERAL
MOBILITY SCORE: 24
DAILY ACTIVITIY SCORE: 24

## 2024-07-05 ASSESSMENT — ACTIVITIES OF DAILY LIVING (ADL): LACK_OF_TRANSPORTATION: YES

## 2024-07-05 ASSESSMENT — PAIN DESCRIPTION - ORIENTATION: ORIENTATION: RIGHT;LEFT

## 2024-07-05 ASSESSMENT — PAIN - FUNCTIONAL ASSESSMENT
PAIN_FUNCTIONAL_ASSESSMENT: 0-10

## 2024-07-05 ASSESSMENT — PAIN DESCRIPTION - LOCATION: LOCATION: FOOT

## 2024-07-05 NOTE — DISCHARGE SUMMARY
INTERNAL MEDICINE DISCHARGE SUMMARY             Discharge Diagnosis   Acute chest pain    Issues Requiring Follow-Up   None    Test Results Pending At Discharge     Pending Labs       No current pending labs.          Hospital Course     From [facility]: Ernestine Figueroa is a 54 y.o. male history of polysubstance abuse including marijuana cocaine, tobacco use disorder, chronic noncompliance, diabetes, polyneuropathy, hypertension, hyperlipidemia dyshidrosis, sleep deprivation, who presented to the ED with chest pain.  Patient reports that pain started around 10 PM last night, described as a tightness, 10/10 in intensity, substernal, nonradiating, associated with shortness of breath, denies any alleviating or aggravating factor.  Patient denies any nausea or vomiting, also denies headache or diaphoresis.  The patient reportedly had similar symptoms several years ago and underwent negative cardiac workup.  Patient has a history of polysubstance abuse, he reported his last cocaine use was yesterday.  He is a 40-pack-year smoker    Hospital course: In the ED,  /74, pulse 72, respiratory rate 14.  CBC showed mild anemia with Hgb 12.6, normal leukocytosis WBC 8.4.  Renal chemistry was only significant for mild LUCERO with creatinine 1.51.  Troponin was negative at 12 and then 11, EKG was negative for any acute ST changes.  Patient received high-dose aspirin 325 mg once, he was also given IV Toradol 15 mg 1x.  Because of his history of cocaine use and the nature of the chest pain, echocardiogram was obtain to assess for any cardiac etiology. TTE on 7/5/24 was unremarkable. Pt did not show any sign or symptoms of withdrawal.        Pertinent Physical Exam At Time of Discharge     Physical Exam  Constitutional:       Appearance: Normal appearance.   HENT:      Head: Normocephalic and atraumatic.   Eyes:      Pupils: Pupils are equal, round, and reactive to light.    Cardiovascular:      Rate and Rhythm: Normal rate.      Heart sounds: Normal heart sounds.   Pulmonary:      Effort: Pulmonary effort is normal.   Abdominal:      Palpations: Abdomen is soft.   Musculoskeletal:         General: Normal range of motion.      Cervical back: Normal range of motion.   Skin:     Capillary Refill: Capillary refill takes less than 2 seconds.   Neurological:      Mental Status: He is alert.   Psychiatric:         Mood and Affect: Mood normal.       Home Medications        Medication List      START taking these medications     gabapentin 300 mg capsule; Commonly known as: Neurontin; Take 1 capsule   (300 mg) by mouth 3 times a day.     CONTINUE taking these medications     amLODIPine 5 mg tablet; Commonly known as: Norvasc   atorvastatin 40 mg tablet; Commonly known as: Lipitor   lisinopril 5 mg tablet     Outpatient Follow-Up     No future appointments.    Deng Thompson MD

## 2024-07-05 NOTE — CARE PLAN
The patient's goals for the shift include will be safe and free from injury by end of shift.    The clinical goals for the shift include Pt will have no pain and be discharged by end of shift.    Problem: Pain  Goal: Takes deep breaths with improved pain control throughout the shift  Outcome: Progressing  Goal: Turns in bed with improved pain control throughout the shift  Outcome: Progressing  Goal: Walks with improved pain control throughout the shift  Outcome: Progressing  Goal: Performs ADL's with improved pain control throughout shift  Outcome: Progressing

## 2024-07-05 NOTE — PROGRESS NOTES
Ernestine Figueroa is a 54 y.o. male on day 1 of admission presenting with Acute chest pain.    Transitional Care Coordination Progress Note:  Patient discussed during interdisciplinary rounds.   Team members present: MD and TCC  Plan per Medical/Surgical team: Patient medically ready.  Payor: [facility]  Discharge disposition: Home, will need Uber or LYFT.  Potential Barriers: None  ADOD: 07/05/24    ANGELICA EASTMAN RN

## 2024-07-06 NOTE — PROGRESS NOTES
07/05/24 1555   Discharge Planning   Living Arrangements Children   Support Systems Family members   Assistance Needed No   Type of Residence Private residence   Number of Stairs to Enter Residence 4   Number of Stairs Within Residence 24   Do you have animals or pets at home? No   Who is requesting discharge planning? Provider   Home or Post Acute Services None   Patient expects to be discharged to: home   Does the patient need discharge transport arranged? Yes   RoundTrip coordination needed? Yes   Has discharge transport been arranged? Yes   What day is the transport expected? 07/05/24   Financial Resource Strain   How hard is it for you to pay for the very basics like food, housing, medical care, and heating? Somewhat   Housing Stability   In the last 12 months, was there a time when you were not able to pay the mortgage or rent on time? Y   In the last 12 months, how many places have you lived? 3   In the last 12 months, was there a time when you did not have a steady place to sleep or slept in a shelter (including now)? Y   Transportation Needs   In the past 12 months, has lack of transportation kept you from medical appointments or from getting medications? yes   In the past 12 months, has lack of transportation kept you from meetings, work, or from getting things needed for daily living? Yes     Assessment Note:  Met with patient and Introduced myself as care coordinator and member of the Care Transitions team for discharge planning. Pt feels safe at home.   Transportation: Will need LYFT or UBER to get home.   Pharmacy: [facility]  DME:No  Previous home care: No  Falls:Yes  PCP: No  Dialysis: No  Address, phone number and emergency contact verified. All questions and concerns addressed. Will continue to follow for any discharge needs. Patient going home no needs.

## 2024-07-07 ENCOUNTER — CLINICAL SUPPORT (OUTPATIENT)
Dept: EMERGENCY MEDICINE | Facility: HOSPITAL | Age: 54
End: 2024-07-07
Payer: COMMERCIAL

## 2024-07-07 ENCOUNTER — HOSPITAL ENCOUNTER (EMERGENCY)
Facility: HOSPITAL | Age: 54
Discharge: HOME | End: 2024-07-07
Attending: EMERGENCY MEDICINE
Payer: COMMERCIAL

## 2024-07-07 ENCOUNTER — APPOINTMENT (OUTPATIENT)
Dept: RADIOLOGY | Facility: HOSPITAL | Age: 54
End: 2024-07-07
Payer: COMMERCIAL

## 2024-07-07 VITALS
BODY MASS INDEX: 25.98 KG/M2 | WEIGHT: 220 LBS | TEMPERATURE: 97.7 F | RESPIRATION RATE: 15 BRPM | HEART RATE: 83 BPM | SYSTOLIC BLOOD PRESSURE: 142 MMHG | HEIGHT: 77 IN | OXYGEN SATURATION: 96 % | DIASTOLIC BLOOD PRESSURE: 85 MMHG

## 2024-07-07 DIAGNOSIS — R07.9 CHEST PAIN, UNSPECIFIED TYPE: Primary | ICD-10-CM

## 2024-07-07 LAB
ALBUMIN SERPL BCP-MCNC: 4 G/DL (ref 3.4–5)
ALP SERPL-CCNC: 51 U/L (ref 33–120)
ALT SERPL W P-5'-P-CCNC: 21 U/L (ref 10–52)
AMPHETAMINES UR QL SCN: ABNORMAL
ANION GAP SERPL CALC-SCNC: 13 MMOL/L (ref 10–20)
AST SERPL W P-5'-P-CCNC: 28 U/L (ref 9–39)
BARBITURATES UR QL SCN: ABNORMAL
BASOPHILS # BLD AUTO: 0.04 X10*3/UL (ref 0–0.1)
BASOPHILS NFR BLD AUTO: 0.4 %
BENZODIAZ UR QL SCN: ABNORMAL
BILIRUB SERPL-MCNC: 0.4 MG/DL (ref 0–1.2)
BUN SERPL-MCNC: 15 MG/DL (ref 6–23)
BZE UR QL SCN: ABNORMAL
CALCIUM SERPL-MCNC: 8.8 MG/DL (ref 8.6–10.6)
CANNABINOIDS UR QL SCN: ABNORMAL
CARDIAC TROPONIN I PNL SERPL HS: 10 NG/L (ref 0–53)
CARDIAC TROPONIN I PNL SERPL HS: 9 NG/L (ref 0–53)
CHLORIDE SERPL-SCNC: 103 MMOL/L (ref 98–107)
CO2 SERPL-SCNC: 26 MMOL/L (ref 21–32)
CREAT SERPL-MCNC: 1.3 MG/DL (ref 0.5–1.3)
EGFRCR SERPLBLD CKD-EPI 2021: 66 ML/MIN/1.73M*2
EOSINOPHIL # BLD AUTO: 0.02 X10*3/UL (ref 0–0.7)
EOSINOPHIL NFR BLD AUTO: 0.2 %
ERYTHROCYTE [DISTWIDTH] IN BLOOD BY AUTOMATED COUNT: 12.5 % (ref 11.5–14.5)
FENTANYL+NORFENTANYL UR QL SCN: ABNORMAL
GLUCOSE SERPL-MCNC: 124 MG/DL (ref 74–99)
HCT VFR BLD AUTO: 35.7 % (ref 41–52)
HGB BLD-MCNC: 12.5 G/DL (ref 13.5–17.5)
IMM GRANULOCYTES # BLD AUTO: 0.03 X10*3/UL (ref 0–0.7)
IMM GRANULOCYTES NFR BLD AUTO: 0.3 % (ref 0–0.9)
LYMPHOCYTES # BLD AUTO: 1.62 X10*3/UL (ref 1.2–4.8)
LYMPHOCYTES NFR BLD AUTO: 16.3 %
MAGNESIUM SERPL-MCNC: 1.78 MG/DL (ref 1.6–2.4)
MCH RBC QN AUTO: 31.9 PG (ref 26–34)
MCHC RBC AUTO-ENTMCNC: 35 G/DL (ref 32–36)
MCV RBC AUTO: 91 FL (ref 80–100)
METHADONE UR QL SCN: ABNORMAL
MONOCYTES # BLD AUTO: 0.68 X10*3/UL (ref 0.1–1)
MONOCYTES NFR BLD AUTO: 6.9 %
NEUTROPHILS # BLD AUTO: 7.53 X10*3/UL (ref 1.2–7.7)
NEUTROPHILS NFR BLD AUTO: 75.9 %
NRBC BLD-RTO: 0 /100 WBCS (ref 0–0)
OPIATES UR QL SCN: ABNORMAL
OXYCODONE+OXYMORPHONE UR QL SCN: ABNORMAL
PCP UR QL SCN: ABNORMAL
PLATELET # BLD AUTO: 239 X10*3/UL (ref 150–450)
POTASSIUM SERPL-SCNC: 3.8 MMOL/L (ref 3.5–5.3)
PROT SERPL-MCNC: 6.6 G/DL (ref 6.4–8.2)
RBC # BLD AUTO: 3.92 X10*6/UL (ref 4.5–5.9)
SODIUM SERPL-SCNC: 138 MMOL/L (ref 136–145)
WBC # BLD AUTO: 9.9 X10*3/UL (ref 4.4–11.3)

## 2024-07-07 PROCEDURE — 99283 EMERGENCY DEPT VISIT LOW MDM: CPT | Mod: 25

## 2024-07-07 PROCEDURE — 93010 ELECTROCARDIOGRAM REPORT: CPT | Performed by: EMERGENCY MEDICINE

## 2024-07-07 PROCEDURE — 2500000001 HC RX 250 WO HCPCS SELF ADMINISTERED DRUGS (ALT 637 FOR MEDICARE OP): Mod: SE | Performed by: STUDENT IN AN ORGANIZED HEALTH CARE EDUCATION/TRAINING PROGRAM

## 2024-07-07 PROCEDURE — 83735 ASSAY OF MAGNESIUM: CPT | Performed by: STUDENT IN AN ORGANIZED HEALTH CARE EDUCATION/TRAINING PROGRAM

## 2024-07-07 PROCEDURE — 80053 COMPREHEN METABOLIC PANEL: CPT | Performed by: STUDENT IN AN ORGANIZED HEALTH CARE EDUCATION/TRAINING PROGRAM

## 2024-07-07 PROCEDURE — 36415 COLL VENOUS BLD VENIPUNCTURE: CPT | Performed by: STUDENT IN AN ORGANIZED HEALTH CARE EDUCATION/TRAINING PROGRAM

## 2024-07-07 PROCEDURE — 99285 EMERGENCY DEPT VISIT HI MDM: CPT | Performed by: EMERGENCY MEDICINE

## 2024-07-07 PROCEDURE — 85025 COMPLETE CBC W/AUTO DIFF WBC: CPT | Performed by: STUDENT IN AN ORGANIZED HEALTH CARE EDUCATION/TRAINING PROGRAM

## 2024-07-07 PROCEDURE — 80307 DRUG TEST PRSMV CHEM ANLYZR: CPT | Performed by: STUDENT IN AN ORGANIZED HEALTH CARE EDUCATION/TRAINING PROGRAM

## 2024-07-07 PROCEDURE — 84484 ASSAY OF TROPONIN QUANT: CPT | Performed by: STUDENT IN AN ORGANIZED HEALTH CARE EDUCATION/TRAINING PROGRAM

## 2024-07-07 PROCEDURE — 93005 ELECTROCARDIOGRAM TRACING: CPT

## 2024-07-07 PROCEDURE — 71046 X-RAY EXAM CHEST 2 VIEWS: CPT

## 2024-07-07 RX ORDER — ASPIRIN 325 MG
325 TABLET ORAL ONCE
Status: COMPLETED | OUTPATIENT
Start: 2024-07-07 | End: 2024-07-07

## 2024-07-07 ASSESSMENT — HEART SCORE
HISTORY: MODERATELY SUSPICIOUS
HEART SCORE: 3
ECG: NORMAL
TROPONIN: LESS THAN OR EQUAL TO NORMAL LIMIT
AGE: 45-64
RISK FACTORS: 1-2 RISK FACTORS

## 2024-07-07 ASSESSMENT — LIFESTYLE VARIABLES
TOTAL SCORE: 0
EVER FELT BAD OR GUILTY ABOUT YOUR DRINKING: NO
HAVE YOU EVER FELT YOU SHOULD CUT DOWN ON YOUR DRINKING: NO
EVER HAD A DRINK FIRST THING IN THE MORNING TO STEADY YOUR NERVES TO GET RID OF A HANGOVER: NO
HAVE PEOPLE ANNOYED YOU BY CRITICIZING YOUR DRINKING: NO

## 2024-07-07 ASSESSMENT — PAIN DESCRIPTION - PROGRESSION: CLINICAL_PROGRESSION: NOT CHANGED

## 2024-07-07 ASSESSMENT — PAIN SCALES - GENERAL: PAINLEVEL_OUTOF10: 8

## 2024-07-07 ASSESSMENT — PAIN DESCRIPTION - PAIN TYPE: TYPE: ACUTE PAIN

## 2024-07-07 ASSESSMENT — PAIN DESCRIPTION - LOCATION: LOCATION: CHEST

## 2024-07-07 ASSESSMENT — PAIN DESCRIPTION - FREQUENCY: FREQUENCY: CONSTANT/CONTINUOUS

## 2024-07-07 ASSESSMENT — COLUMBIA-SUICIDE SEVERITY RATING SCALE - C-SSRS
2. HAVE YOU ACTUALLY HAD ANY THOUGHTS OF KILLING YOURSELF?: NO
1. IN THE PAST MONTH, HAVE YOU WISHED YOU WERE DEAD OR WISHED YOU COULD GO TO SLEEP AND NOT WAKE UP?: NO
6. HAVE YOU EVER DONE ANYTHING, STARTED TO DO ANYTHING, OR PREPARED TO DO ANYTHING TO END YOUR LIFE?: NO

## 2024-07-07 ASSESSMENT — PAIN DESCRIPTION - ORIENTATION: ORIENTATION: LEFT

## 2024-07-07 ASSESSMENT — PAIN DESCRIPTION - DESCRIPTORS: DESCRIPTORS: ACHING

## 2024-07-07 ASSESSMENT — PAIN DESCRIPTION - ONSET: ONSET: ONGOING

## 2024-07-07 ASSESSMENT — PAIN - FUNCTIONAL ASSESSMENT: PAIN_FUNCTIONAL_ASSESSMENT: 0-10

## 2024-07-07 NOTE — ED TRIAGE NOTES
"Patient to ED with complaints of chest pain and shortness of breath. Reports he was running from a loose dog. Reports he was previously attacked by a dog and had some anxiety as well. PMH HTN. Mentioned during triage that he's staying with family who is \"psychotic\". Would like some resources for housing if able.   "

## 2024-07-07 NOTE — ED PROVIDER NOTES
Emergency Department Provider Note        History of Present Illness     History provided by: Patient  Limitations to History: None  External Records Reviewed with Brief Summary: None    HPI:  Ernestine Figueroa is a 54 y.o. male presenting to the emergency department for left-sided chest pain and trouble breathing.  Patient notes he was running from a dog this evening.  He notes he is also scared of dogs, the pain started after that.  He was recently seen for similar chest pain several days ago in the emergency department, he was admitted to the hospital where an echocardiogram was performed and he was discharged after as there were no significant findings.  Of note, patient has not had a stress test performed recently.  Patient did use cocaine prior to last visit, however he has not used any in the past several days.    Physical Exam   Triage vitals:  T    HR 84  BP (!) 149/101  RR 16  O2 96 % None (Room air)    General: Awake, alert, in no acute distress  Eyes: Gaze conjugate.  No scleral icterus or injection  HENT: Normo-cephalic, atraumatic. No stridor  CV: Regular rate, regular rhythm. Radial pulses 2+ bilaterally  Resp: Breathing non-labored, speaking in full sentences.  Clear to auscultation bilaterally  GI: Soft, non-distended, non-tender. No rebound or guarding.  MSK/Extremities: No gross bony deformities. Moving all extremities  Skin: Warm. Appropriate color  Neuro: Alert. Oriented. Face symmetric. Speech is fluent.    Psych: Appropriate mood and affect    Medical Decision Making & ED Course   Medical Decision Makin y.o. male presenting with chest pain.  Patient notes that he developed chest pain when he was running from a dog.  He notes he has had issues with this before, he also recently came into the hospital for similar chest pain prior where he had an echocardiogram and negative cardiac workup.  Patient also notes some housing difficulties, he is hoping to get out of his current living  situation to avoid having to be dealing with the level of stress and tension that he currently does.  Workup was performed in the emergency department including 2 view chest x-ray and troponins which were nonactionable.  Discussed with the patient the 1 part of his workup but not been performed as a stress test, we will plan to put in for outpatient referral for cardiac stress test.  Additionally we will plan for referral to cardiology.  With regards to housing difficulties, we do not have social work available today.  However, gave him contact information for [employer] shelter.  This was given in the after visit summary.  ----  Scoring Tools Utilized:   Heart score of 3    Differential diagnoses considered include but are not limited to: STEMI, NSTEMI, anxiety, pneumothorax, pulled muscle.     Social Determinants of Health which Significantly Impact Care: Housing insecurity The following actions were taken to address these social determinants: Patient given list of homeless shelters    EKG Independent Interpretation: The EKG obtained at 01:58 was independently interpreted by myself. It demonstrates normal rhythm with a ventricular rate of 73. normal axis. Intervals normal. ST segments showed no concerning changes. Stable compared to prior EKG from 7/4/2024    Independent Result Review and Interpretation: Chest X-Ray as interpreted by me revealed no signs of pneumothroax or pneumonia, no signs of fluid collection    Chronic conditions affecting the patient's care: As documented above in [facility]    The patient was discussed with the following consultants/services: None    Care Considerations: As documented above in [facility]    ED Course:  Diagnoses as of 07/07/24 0536   Chest pain, unspecified type     Disposition   As a result of the work-up, the patient was discharged home.  he was informed of his diagnosis and instructed to come back with any concerns or worsening of condition.  he and was agreeable to the plan as discussed  above.  he was given the opportunity to ask questions.  All of the patient's questions were answered.    Procedures   Procedures        Andrews Maciel DO  Emergency Medicine     Andrews Maciel DO  Resident  07/07/24 0541    ---------------------------------  This patient was seen by the resident physician. I have seen and examined the patient, agree with the workup, evaluation, management and diagnosis. The care plan has been discussed and I concur.    My assessment reveals the following:    HPI:  Patient is a 53 y/o male presenting with chest pain for past several hours after being chased by a dog. Has a sig phobia of dogs. Dog did not attack him. Also reports SOB. CP described as achy in center of his chest without radiation. No F/C/N/V/abd pain. H/o similar symptoms due to cocaine and was admitted and got an echo, but no provocative testing. Has not cocaine for 4 days.     PE:  Vital signs reviewed in nursing triage note, EMR flowsheets, and at patient's bedside  GEN: Patient is awake, alert, calm, cooperative, and in mild painful distress.  HEAD: Normocephalic and atraumatic.  EYES: Anicteric sclera.  MOUTH: Mucous membranes moist.  CV: Regular rate and rhythm. (+) s1/s2. No murmurs/rubs/gallops.  PULM: CTAB. No wheezes, rales, or crackles.  GI: Soft, non-tender, non-distended without rebound or guarding.  EXT: No deformities noted.   NEURO: Moves all extremities.   SKIN: Warm, dry. No erythema or ecchymosis.    Labs Reviewed   CBC WITH AUTO DIFFERENTIAL - Abnormal       Result Value    WBC 9.9      nRBC 0.0      RBC 3.92 (*)     Hemoglobin 12.5 (*)     Hematocrit 35.7 (*)     MCV 91      MCH 31.9      MCHC 35.0      RDW 12.5      Platelets 239      Neutrophils % 75.9      Immature Granulocytes %, Automated 0.3      Lymphocytes % 16.3      Monocytes % 6.9      Eosinophils % 0.2      Basophils % 0.4      Neutrophils Absolute 7.53      Immature Granulocytes Absolute, Automated 0.03      Lymphocytes Absolute  1.62      Monocytes Absolute 0.68      Eosinophils Absolute 0.02      Basophils Absolute 0.04     COMPREHENSIVE METABOLIC PANEL - Abnormal    Glucose 124 (*)     Sodium 138      Potassium 3.8      Chloride 103      Bicarbonate 26      Anion Gap 13      Urea Nitrogen 15      Creatinine 1.30      eGFR 66      Calcium 8.8      Albumin 4.0      Alkaline Phosphatase 51      Total Protein 6.6      AST 28      Bilirubin, Total 0.4      ALT 21     DRUG SCREEN,URINE - Abnormal    Amphetamine Screen, Urine Presumptive Negative      Barbiturate Screen, Urine Presumptive Negative      Benzodiazepines Screen, Urine Presumptive Positive (*)     Cannabinoid Screen, Urine Presumptive Positive (*)     Cocaine Metabolite Screen, Urine Presumptive Positive (*)     Fentanyl Screen, Urine Presumptive Negative      Opiate Screen, Urine Presumptive Negative      Oxycodone Screen, Urine Presumptive Negative      PCP Screen, Urine Presumptive Negative      Methadone Screen, Urine Presumptive Negative      Narrative:     Drug screen results are presumptive and should not be used to assess   compliance with prescribed medication. Contact the performing [facility] laboratory   to add-on definitive confirmatory testing if clinically indicated.    Toxicology screening results are reported qualitatively. The concentration must   be greater than or equal to the cutoff to be reported as positive. The concentration   at which the screening test can detect an individual drug or metabolite varies.   The absence of expected drug(s) and/or drug metabolite(s) may indicate non-compliance,   inappropriate timing of specimen collection relative to drug administration, poor drug   absorption, diluted/adulterated urine, or limitations of testing. For medical purposes   only; not valid for forensic use.    Interpretive questions should be directed to the laboratory medical directors.   MAGNESIUM - Normal    Magnesium 1.78     SERIAL TROPONIN-INITIAL - Normal     Troponin I, High Sensitivity (CMC) 10      Narrative:     Less than 99th percentile of normal range cutoff-  Female and children under 18 years old <35 ng/L; Male <54 ng/L: Negative  Repeat testing should be performed if clinically indicated.     Female and children under 18 years old  ng/L; Male  ng/L:  Consistent with possible cardiac damage and possible increased clinical   risk. Serial measurements may help to assess extent of myocardial damage.     >120 ng/L: Consistent with cardiac damage, increased clinical risk and  myocardial infarction. Serial measurements may help assess extent of   myocardial damage.      NOTE: Children less than 1 year old may have higher baseline troponin   levels and results should be interpreted in conjunction with the overall   clinical context.    NOTE: Troponin I testing is performed using a different   testing methodology at [facility]] than at other   [facility]. Direct result comparisons should only   be made within the same method.     SERIAL TROPONIN, 1 HOUR - Normal    Troponin I, High Sensitivity (CMC) 9      Narrative:     Less than 99th percentile of normal range cutoff-  Female and children under 18 years old <35 ng/L; Male <54 ng/L: Negative  Repeat testing should be performed if clinically indicated.     Female and children under 18 years old  ng/L; Male  ng/L:  Consistent with possible cardiac damage and possible increased clinical   risk. Serial measurements may help to assess extent of myocardial damage.     >120 ng/L: Consistent with cardiac damage, increased clinical risk and  myocardial infarction. Serial measurements may help assess extent of   myocardial damage.      NOTE: Children less than 1 year old may have higher baseline troponin   levels and results should be interpreted in conjunction with the overall   clinical context.    NOTE: Troponin I testing is performed using a different   testing methodology at  [facility] than at other   [facility]. Direct result comparisons should only   be made within the same method.     TROPONIN SERIES- (INITIAL, 1 HR)    Narrative:     The following orders were created for panel order Troponin I Series, High Sensitivity (0, 1 HR).  Procedure                               Abnormality         Status                     ---------                               -----------         ------                     Troponin I, High Sensiti...[[id number]]  Normal              Final result               Troponin, High Sensitivi...[[id number]]  Normal              Final result                 Please view results for these tests on the individual orders.     XR chest 2 views   Final Result   No acute pulmonary abnormality.   Signed by Bryson Golden MD      Echocardiogram Stress Test    (Results Pending)         Medical Decision Making:  - IV  - Labs  - CXR  -  mg PO  - Delta trop negative  - Outpatient stress test ordered.  - Patient advised to follow up with PMD in 3-5 days.   - Cardiology referral for outpatient.  - Patient advised to return to the ED for any worsening or new symptoms.     EKG: EKG independently reviewed by the attending ED physician, and I and concur with the resident's/advanced practice provider's interpretation. Sinus rhythm at 73 bpm, normal axis, normal intervals, no ST or T wave changes. Similar to old EKG on 7/4/24.    Differential Diagnoses Considered: ACS, Anxiety, Panic attack    Chronic Medical Conditions Significantly Affecting Care: Cocaine use    Independent Interpretation of Studies:  I independently interpreted: CXR showing no acute cardiopulmonary disease.    Escalation of Care:  Appropriate for outpatient management    MD Jozef Sales MD  07/07/24 5661

## 2024-07-08 LAB
ATRIAL RATE: 73 BPM
P AXIS: 77 DEGREES
P OFFSET: 206 MS
P ONSET: 140 MS
PR INTERVAL: 162 MS
Q ONSET: 221 MS
QRS COUNT: 12 BEATS
QRS DURATION: 86 MS
QT INTERVAL: 398 MS
QTC CALCULATION(BAZETT): 438 MS
QTC FREDERICIA: 425 MS
R AXIS: 72 DEGREES
T AXIS: 66 DEGREES
T OFFSET: 420 MS
VENTRICULAR RATE: 73 BPM

## 2025-07-26 ENCOUNTER — CLINICAL SUPPORT (OUTPATIENT)
Dept: EMERGENCY MEDICINE | Facility: HOSPITAL | Age: 55
End: 2025-07-26
Payer: COMMERCIAL

## 2025-07-26 ENCOUNTER — HOSPITAL ENCOUNTER (EMERGENCY)
Facility: HOSPITAL | Age: 55
Discharge: HOME | End: 2025-07-27
Attending: STUDENT IN AN ORGANIZED HEALTH CARE EDUCATION/TRAINING PROGRAM
Payer: COMMERCIAL

## 2025-07-26 DIAGNOSIS — F19.920 DRUG INTOXICATION WITHOUT COMPLICATION (MULTI): Primary | ICD-10-CM

## 2025-07-26 LAB
ALBUMIN SERPL BCP-MCNC: 4.2 G/DL (ref 3.4–5)
ALP SERPL-CCNC: 54 U/L (ref 33–120)
ALT SERPL W P-5'-P-CCNC: 23 U/L (ref 10–52)
ANION GAP SERPL CALC-SCNC: 14 MMOL/L (ref 10–20)
APAP SERPL-MCNC: <10 UG/ML (ref ?–30)
AST SERPL W P-5'-P-CCNC: 31 U/L (ref 9–39)
ATRIAL RATE: 104 BPM
BASOPHILS # BLD AUTO: 0.05 X10*3/UL (ref 0–0.1)
BASOPHILS NFR BLD AUTO: 0.6 %
BILIRUB SERPL-MCNC: 0.6 MG/DL (ref 0–1.2)
BUN SERPL-MCNC: 15 MG/DL (ref 6–23)
CALCIUM SERPL-MCNC: 8.7 MG/DL (ref 8.6–10.6)
CARDIAC TROPONIN I PNL SERPL HS: 16 NG/L (ref 0–53)
CARDIAC TROPONIN I PNL SERPL HS: 17 NG/L (ref 0–53)
CHLORIDE SERPL-SCNC: 107 MMOL/L (ref 98–107)
CK SERPL-CCNC: 577 U/L (ref 0–325)
CO2 SERPL-SCNC: 25 MMOL/L (ref 21–32)
CREAT SERPL-MCNC: 1.24 MG/DL (ref 0.5–1.3)
EGFRCR SERPLBLD CKD-EPI 2021: 69 ML/MIN/1.73M*2
EOSINOPHIL # BLD AUTO: 0.03 X10*3/UL (ref 0–0.7)
EOSINOPHIL NFR BLD AUTO: 0.4 %
ERYTHROCYTE [DISTWIDTH] IN BLOOD BY AUTOMATED COUNT: 12.1 % (ref 11.5–14.5)
ETHANOL SERPL-MCNC: 86 MG/DL
GLUCOSE BLD MANUAL STRIP-MCNC: 104 MG/DL (ref 74–99)
GLUCOSE SERPL-MCNC: 103 MG/DL (ref 74–99)
HCT VFR BLD AUTO: 40.2 % (ref 41–52)
HGB BLD-MCNC: 13.8 G/DL (ref 13.5–17.5)
IMM GRANULOCYTES # BLD AUTO: 0.02 X10*3/UL (ref 0–0.7)
IMM GRANULOCYTES NFR BLD AUTO: 0.3 % (ref 0–0.9)
LYMPHOCYTES # BLD AUTO: 2.04 X10*3/UL (ref 1.2–4.8)
LYMPHOCYTES NFR BLD AUTO: 26.1 %
MCH RBC QN AUTO: 32.5 PG (ref 26–34)
MCHC RBC AUTO-ENTMCNC: 34.3 G/DL (ref 32–36)
MCV RBC AUTO: 95 FL (ref 80–100)
MONOCYTES # BLD AUTO: 0.55 X10*3/UL (ref 0.1–1)
MONOCYTES NFR BLD AUTO: 7 %
NEUTROPHILS # BLD AUTO: 5.14 X10*3/UL (ref 1.2–7.7)
NEUTROPHILS NFR BLD AUTO: 65.6 %
NRBC BLD-RTO: 0 /100 WBCS (ref 0–0)
P AXIS: 68 DEGREES
P OFFSET: 208 MS
P ONSET: 146 MS
PLATELET # BLD AUTO: 209 X10*3/UL (ref 150–450)
POTASSIUM SERPL-SCNC: 3.3 MMOL/L (ref 3.5–5.3)
PR INTERVAL: 150 MS
PROT SERPL-MCNC: 6.5 G/DL (ref 6.4–8.2)
Q ONSET: 221 MS
QRS COUNT: 17 BEATS
QRS DURATION: 82 MS
QT INTERVAL: 342 MS
QTC CALCULATION(BAZETT): 449 MS
QTC FREDERICIA: 410 MS
R AXIS: -2 DEGREES
RBC # BLD AUTO: 4.25 X10*6/UL (ref 4.5–5.9)
SALICYLATES SERPL-MCNC: <3 MG/DL (ref ?–20)
SODIUM SERPL-SCNC: 143 MMOL/L (ref 136–145)
T AXIS: 76 DEGREES
T OFFSET: 392 MS
VENTRICULAR RATE: 104 BPM
WBC # BLD AUTO: 7.8 X10*3/UL (ref 4.4–11.3)

## 2025-07-26 PROCEDURE — 93010 ELECTROCARDIOGRAM REPORT: CPT | Performed by: STUDENT IN AN ORGANIZED HEALTH CARE EDUCATION/TRAINING PROGRAM

## 2025-07-26 PROCEDURE — 84484 ASSAY OF TROPONIN QUANT: CPT | Performed by: STUDENT IN AN ORGANIZED HEALTH CARE EDUCATION/TRAINING PROGRAM

## 2025-07-26 PROCEDURE — 36415 COLL VENOUS BLD VENIPUNCTURE: CPT | Performed by: STUDENT IN AN ORGANIZED HEALTH CARE EDUCATION/TRAINING PROGRAM

## 2025-07-26 PROCEDURE — 99291 CRITICAL CARE FIRST HOUR: CPT | Performed by: STUDENT IN AN ORGANIZED HEALTH CARE EDUCATION/TRAINING PROGRAM

## 2025-07-26 PROCEDURE — 93005 ELECTROCARDIOGRAM TRACING: CPT

## 2025-07-26 PROCEDURE — 80143 DRUG ASSAY ACETAMINOPHEN: CPT | Performed by: STUDENT IN AN ORGANIZED HEALTH CARE EDUCATION/TRAINING PROGRAM

## 2025-07-26 PROCEDURE — 2500000004 HC RX 250 GENERAL PHARMACY W/ HCPCS (ALT 636 FOR OP/ED): Mod: SE

## 2025-07-26 PROCEDURE — 82550 ASSAY OF CK (CPK): CPT | Performed by: STUDENT IN AN ORGANIZED HEALTH CARE EDUCATION/TRAINING PROGRAM

## 2025-07-26 PROCEDURE — 80053 COMPREHEN METABOLIC PANEL: CPT | Performed by: STUDENT IN AN ORGANIZED HEALTH CARE EDUCATION/TRAINING PROGRAM

## 2025-07-26 PROCEDURE — 82947 ASSAY GLUCOSE BLOOD QUANT: CPT

## 2025-07-26 PROCEDURE — 93005 ELECTROCARDIOGRAM TRACING: CPT | Mod: 76

## 2025-07-26 PROCEDURE — 99284 EMERGENCY DEPT VISIT MOD MDM: CPT

## 2025-07-26 PROCEDURE — 96372 THER/PROPH/DIAG INJ SC/IM: CPT

## 2025-07-26 PROCEDURE — 85025 COMPLETE CBC W/AUTO DIFF WBC: CPT | Performed by: STUDENT IN AN ORGANIZED HEALTH CARE EDUCATION/TRAINING PROGRAM

## 2025-07-26 RX ORDER — HALOPERIDOL LACTATE 5 MG/ML
5 INJECTION, SOLUTION INTRAMUSCULAR ONCE
Status: COMPLETED | OUTPATIENT
Start: 2025-07-26 | End: 2025-07-26

## 2025-07-26 RX ORDER — HALOPERIDOL LACTATE 5 MG/ML
INJECTION, SOLUTION INTRAMUSCULAR
Status: COMPLETED
Start: 2025-07-26 | End: 2025-07-26

## 2025-07-26 RX ORDER — LORAZEPAM 1 MG/1
2 TABLET ORAL ONCE
Status: DISCONTINUED | OUTPATIENT
Start: 2025-07-26 | End: 2025-07-27 | Stop reason: HOSPADM

## 2025-07-26 RX ORDER — MIDAZOLAM HYDROCHLORIDE 5 MG/ML
INJECTION, SOLUTION INTRAMUSCULAR; INTRAVENOUS
Status: COMPLETED
Start: 2025-07-26 | End: 2025-07-26

## 2025-07-26 RX ORDER — MIDAZOLAM HYDROCHLORIDE 5 MG/ML
5 INJECTION, SOLUTION INTRAMUSCULAR; INTRAVENOUS ONCE
Status: COMPLETED | OUTPATIENT
Start: 2025-07-26 | End: 2025-07-26

## 2025-07-26 RX ADMIN — MIDAZOLAM HYDROCHLORIDE 5 MG: 5 INJECTION, SOLUTION INTRAMUSCULAR; INTRAVENOUS at 20:27

## 2025-07-26 RX ADMIN — HALOPERIDOL LACTATE 5 MG: 5 INJECTION, SOLUTION INTRAMUSCULAR at 20:28

## 2025-07-26 ASSESSMENT — PAIN SCALES - GENERAL
PAINLEVEL_OUTOF10: 8
PAINLEVEL_OUTOF10: 6

## 2025-07-26 ASSESSMENT — PAIN DESCRIPTION - DESCRIPTORS
DESCRIPTORS: ACHING
DESCRIPTORS: ACHING

## 2025-07-26 ASSESSMENT — PAIN - FUNCTIONAL ASSESSMENT: PAIN_FUNCTIONAL_ASSESSMENT: 0-10

## 2025-07-26 ASSESSMENT — PAIN DESCRIPTION - LOCATION: LOCATION: CHEST

## 2025-07-26 ASSESSMENT — PAIN DESCRIPTION - ORIENTATION: ORIENTATION: LEFT

## 2025-07-26 ASSESSMENT — PAIN DESCRIPTION - PAIN TYPE: TYPE: ACUTE PAIN

## 2025-07-26 NOTE — ED TRIAGE NOTES
Pt presents to the ED for complaints of SOB for the last 5 and a half hours. Pt has a P[MHX of HTN and type 2 diabetes. Pt states that his SOB gets worse when he walks around and states it less when he sits down. Pt was admitted over a year ago for the same type of symptoms. Pt states that his chest pain is more towards the left side of his chest and is intermittent. Pt rates this chest pain at a 8/10. Pt's sugar was 109 in triage

## 2025-07-27 ENCOUNTER — HOSPITAL ENCOUNTER (EMERGENCY)
Facility: HOSPITAL | Age: 55
Discharge: HOME | End: 2025-07-28
Attending: EMERGENCY MEDICINE
Payer: COMMERCIAL

## 2025-07-27 ENCOUNTER — CLINICAL SUPPORT (OUTPATIENT)
Dept: EMERGENCY MEDICINE | Facility: HOSPITAL | Age: 55
End: 2025-07-27
Payer: COMMERCIAL

## 2025-07-27 ENCOUNTER — APPOINTMENT (OUTPATIENT)
Dept: RADIOLOGY | Facility: HOSPITAL | Age: 55
End: 2025-07-27
Payer: COMMERCIAL

## 2025-07-27 VITALS
HEART RATE: 108 BPM | WEIGHT: 220 LBS | OXYGEN SATURATION: 98 % | RESPIRATION RATE: 18 BRPM | DIASTOLIC BLOOD PRESSURE: 102 MMHG | BODY MASS INDEX: 25.98 KG/M2 | TEMPERATURE: 97.5 F | SYSTOLIC BLOOD PRESSURE: 185 MMHG | HEIGHT: 77 IN

## 2025-07-27 VITALS
HEART RATE: 74 BPM | HEIGHT: 77 IN | SYSTOLIC BLOOD PRESSURE: 151 MMHG | OXYGEN SATURATION: 96 % | WEIGHT: 220 LBS | RESPIRATION RATE: 16 BRPM | TEMPERATURE: 97.7 F | DIASTOLIC BLOOD PRESSURE: 80 MMHG | BODY MASS INDEX: 25.98 KG/M2

## 2025-07-27 DIAGNOSIS — R06.02 SHORTNESS OF BREATH: Primary | ICD-10-CM

## 2025-07-27 DIAGNOSIS — I71.9 AORTIC ANEURYSM WITHOUT RUPTURE, UNSPECIFIED PORTION OF AORTA: ICD-10-CM

## 2025-07-27 LAB
ALBUMIN SERPL BCP-MCNC: 4.3 G/DL (ref 3.4–5)
ALP SERPL-CCNC: 57 U/L (ref 33–120)
ALT SERPL W P-5'-P-CCNC: 27 U/L (ref 10–52)
ANION GAP SERPL CALC-SCNC: 15 MMOL/L (ref 10–20)
AST SERPL W P-5'-P-CCNC: 41 U/L (ref 9–39)
BASOPHILS # BLD AUTO: 0.04 X10*3/UL (ref 0–0.1)
BASOPHILS NFR BLD AUTO: 0.5 %
BILIRUB SERPL-MCNC: 0.7 MG/DL (ref 0–1.2)
BNP SERPL-MCNC: 22 PG/ML (ref 0–99)
BUN SERPL-MCNC: 15 MG/DL (ref 6–23)
CALCIUM SERPL-MCNC: 9.5 MG/DL (ref 8.6–10.6)
CARDIAC TROPONIN I PNL SERPL HS: 12 NG/L (ref 0–53)
CARDIAC TROPONIN I PNL SERPL HS: 12 NG/L (ref 0–53)
CHLORIDE SERPL-SCNC: 106 MMOL/L (ref 98–107)
CO2 SERPL-SCNC: 24 MMOL/L (ref 21–32)
CREAT SERPL-MCNC: 1.25 MG/DL (ref 0.5–1.3)
D DIMER PPP FEU-MCNC: 1274 NG/ML FEU
EGFRCR SERPLBLD CKD-EPI 2021: 68 ML/MIN/1.73M*2
EOSINOPHIL # BLD AUTO: 0.04 X10*3/UL (ref 0–0.7)
EOSINOPHIL NFR BLD AUTO: 0.5 %
ERYTHROCYTE [DISTWIDTH] IN BLOOD BY AUTOMATED COUNT: 12.1 % (ref 11.5–14.5)
GLUCOSE SERPL-MCNC: 103 MG/DL (ref 74–99)
HCT VFR BLD AUTO: 42.9 % (ref 41–52)
HGB BLD-MCNC: 15 G/DL (ref 13.5–17.5)
IMM GRANULOCYTES # BLD AUTO: 0.02 X10*3/UL (ref 0–0.7)
IMM GRANULOCYTES NFR BLD AUTO: 0.2 % (ref 0–0.9)
LYMPHOCYTES # BLD AUTO: 1.72 X10*3/UL (ref 1.2–4.8)
LYMPHOCYTES NFR BLD AUTO: 20.6 %
MCH RBC QN AUTO: 32.8 PG (ref 26–34)
MCHC RBC AUTO-ENTMCNC: 35 G/DL (ref 32–36)
MCV RBC AUTO: 94 FL (ref 80–100)
MONOCYTES # BLD AUTO: 0.48 X10*3/UL (ref 0.1–1)
MONOCYTES NFR BLD AUTO: 5.7 %
NEUTROPHILS # BLD AUTO: 6.05 X10*3/UL (ref 1.2–7.7)
NEUTROPHILS NFR BLD AUTO: 72.5 %
NRBC BLD-RTO: 0 /100 WBCS (ref 0–0)
PLATELET # BLD AUTO: 227 X10*3/UL (ref 150–450)
POTASSIUM SERPL-SCNC: 4.1 MMOL/L (ref 3.5–5.3)
PROT SERPL-MCNC: 6.8 G/DL (ref 6.4–8.2)
RBC # BLD AUTO: 4.58 X10*6/UL (ref 4.5–5.9)
SODIUM SERPL-SCNC: 141 MMOL/L (ref 136–145)
WBC # BLD AUTO: 8.4 X10*3/UL (ref 4.4–11.3)

## 2025-07-27 PROCEDURE — 36415 COLL VENOUS BLD VENIPUNCTURE: CPT | Performed by: EMERGENCY MEDICINE

## 2025-07-27 PROCEDURE — 71046 X-RAY EXAM CHEST 2 VIEWS: CPT

## 2025-07-27 PROCEDURE — 84484 ASSAY OF TROPONIN QUANT: CPT | Performed by: EMERGENCY MEDICINE

## 2025-07-27 PROCEDURE — 99285 EMERGENCY DEPT VISIT HI MDM: CPT | Mod: 25 | Performed by: EMERGENCY MEDICINE

## 2025-07-27 PROCEDURE — 80053 COMPREHEN METABOLIC PANEL: CPT | Performed by: EMERGENCY MEDICINE

## 2025-07-27 PROCEDURE — 99291 CRITICAL CARE FIRST HOUR: CPT | Performed by: STUDENT IN AN ORGANIZED HEALTH CARE EDUCATION/TRAINING PROGRAM

## 2025-07-27 PROCEDURE — 71046 X-RAY EXAM CHEST 2 VIEWS: CPT | Performed by: RADIOLOGY

## 2025-07-27 PROCEDURE — 99285 EMERGENCY DEPT VISIT HI MDM: CPT | Performed by: EMERGENCY MEDICINE

## 2025-07-27 PROCEDURE — 71275 CT ANGIOGRAPHY CHEST: CPT

## 2025-07-27 PROCEDURE — 83880 ASSAY OF NATRIURETIC PEPTIDE: CPT | Performed by: EMERGENCY MEDICINE

## 2025-07-27 PROCEDURE — 85379 FIBRIN DEGRADATION QUANT: CPT | Performed by: EMERGENCY MEDICINE

## 2025-07-27 PROCEDURE — 93010 ELECTROCARDIOGRAM REPORT: CPT

## 2025-07-27 PROCEDURE — 93005 ELECTROCARDIOGRAM TRACING: CPT | Mod: 76

## 2025-07-27 PROCEDURE — 93005 ELECTROCARDIOGRAM TRACING: CPT

## 2025-07-27 PROCEDURE — 71275 CT ANGIOGRAPHY CHEST: CPT | Performed by: RADIOLOGY

## 2025-07-27 PROCEDURE — 2550000001 HC RX 255 CONTRASTS: Mod: SE | Performed by: EMERGENCY MEDICINE

## 2025-07-27 PROCEDURE — 85025 COMPLETE CBC W/AUTO DIFF WBC: CPT | Performed by: EMERGENCY MEDICINE

## 2025-07-27 RX ORDER — ALBUTEROL SULFATE 0.83 MG/ML
2.5 SOLUTION RESPIRATORY (INHALATION) ONCE
Status: DISCONTINUED | OUTPATIENT
Start: 2025-07-27 | End: 2025-07-28 | Stop reason: HOSPADM

## 2025-07-27 RX ADMIN — IOHEXOL 80 ML: 350 INJECTION, SOLUTION INTRAVENOUS at 22:20

## 2025-07-27 SDOH — HEALTH STABILITY: MENTAL HEALTH: IN THE PAST WEEK, HAVE YOU BEEN HAVING THOUGHTS ABOUT KILLING YOURSELF?: NO

## 2025-07-27 SDOH — HEALTH STABILITY: MENTAL HEALTH: SUICIDAL BEHAVIOR (LIFETIME): NO

## 2025-07-27 SDOH — HEALTH STABILITY: MENTAL HEALTH: IN THE PAST FEW WEEKS, HAVE YOU FELT THAT YOU OR YOUR FAMILY WOULD BE BETTER OFF IF YOU WERE DEAD?: NO

## 2025-07-27 SDOH — HEALTH STABILITY: MENTAL HEALTH: WISH TO BE DEAD (PAST 1 MONTH): NO

## 2025-07-27 SDOH — HEALTH STABILITY: MENTAL HEALTH: ARE YOU HAVING THOUGHTS OF KILLING YOURSELF RIGHT NOW?: NO

## 2025-07-27 SDOH — HEALTH STABILITY: MENTAL HEALTH: NON-SPECIFIC ACTIVE SUICIDAL THOUGHTS (PAST 1 MONTH): NO

## 2025-07-27 SDOH — HEALTH STABILITY: MENTAL HEALTH: HAVE YOU EVER TRIED TO KILL YOURSELF?: NO

## 2025-07-27 SDOH — HEALTH STABILITY: MENTAL HEALTH: ANXIETY SYMPTOMS: GENERALIZED

## 2025-07-27 SDOH — ECONOMIC STABILITY: HOUSING INSECURITY: FEELS SAFE LIVING IN HOME: YES

## 2025-07-27 SDOH — HEALTH STABILITY: MENTAL HEALTH: IN THE PAST FEW WEEKS, HAVE YOU WISHED YOU WERE DEAD?: NO

## 2025-07-27 SDOH — HEALTH STABILITY: MENTAL HEALTH: DEPRESSION SYMPTOMS: NO PROBLEMS REPORTED OR OBSERVED.

## 2025-07-27 ASSESSMENT — PAIN DESCRIPTION - FREQUENCY: FREQUENCY: INTERMITTENT

## 2025-07-27 ASSESSMENT — PAIN SCALES - GENERAL
PAINLEVEL_OUTOF10: 6
PAINLEVEL_OUTOF10: 7

## 2025-07-27 ASSESSMENT — PAIN DESCRIPTION - DESCRIPTORS: DESCRIPTORS: ACHING

## 2025-07-27 ASSESSMENT — PAIN DESCRIPTION - LOCATION: LOCATION: CHEST

## 2025-07-27 ASSESSMENT — LIFESTYLE VARIABLES
SUBSTANCE_ABUSE_PAST_12_MONTHS: YES
PRESCIPTION_ABUSE_PAST_12_MONTHS: YES

## 2025-07-27 ASSESSMENT — PAIN - FUNCTIONAL ASSESSMENT: PAIN_FUNCTIONAL_ASSESSMENT: 0-10

## 2025-07-27 NOTE — ED TRIAGE NOTES
Pt presents to ED for shortness of breath. Pt states he was seen here yesterday and is still SOB before discharge this morning. Pt speaking in complete sentences, respirations even and unlabored, no signs of distress. Pt also endorses mild chest pain when taking a deep breath, states it comes and goes. Pt denies alcohol or drug use today. Pt states he has hx of HTN and DM, is not medication compliant.

## 2025-07-27 NOTE — PROGRESS NOTES
"EPAT - Social Work Psychiatric Assessment    Arrival Details  Mode of Arrival: Ambulatory  Admission Source: Other (Comment) (Outside)  Admission Type: Voluntary  EPAT Assessment Start Date: 07/27/25  EPAT Assessment Start Time: 0233  Name of : Julia BANERJEE    History of Present Illness  Admission Reason: Alcohol and Substance use Disorder  HPI: 56 y/o make seen at [facility] for a psychiatric evaluation. Pt brought himself to the ED due to shortness of breath. Pt chart, provider and  Per provider note he consumed alcohol and cocaine earlier in the day. BAL 86 upon arrival and UDS had not resulted at the time of assessment. He denies suicidal or homicidal ideation. Denies hallucinations, delusions or paranoia.    SW Readmission Information   Readmission within 30 Days: No    Psychiatric Symptoms  Anxiety Symptoms: Generalized  Depression Symptoms: No problems reported or observed.  Jeannine Symptoms: No problems reported or observed.    Psychosis Symptoms  Hallucination Type: No problems reported or observed.  Delusion Type: No problems reported or observed.    Additional Symptoms - Adult  Generalized Anxiety Disorder: No problems reported or observed.  Obsessive Compulsive Disorder: No problems reported or observed.  Panic Attack: No problems reported or observed.  Post Traumatic Stress Disorder: No problems reported or observed.  Delirium: No problems reported or observed.  Review of Symptoms Comments: \" Life just been lifing\"    Past Psychiatric History/Meds/Treatments  Past Psychiatric History: Substance Use Disorder  Past Psychiatric Meds/Treatments: Denies  Past Violence/Victimization History: Denies not noted in chart    Current Mental Health Contacts   Name/Phone Number: Denies   Last Appointment Date: Denies  Provider Name/Phone Number: Denies  Provider Last Appointment Date: Denies    Support System: Friends    Living Arrangement: Homeless    Home Safety  Feels Safe Living in Home: " Yes  Potentially Unsafe Housing Conditions: Unable to Assess  Home Safety : Pt is homeless    Income Information  Employment Status for: Patient  Income Source: Self-employed  Current/Previous Occupation: Self-Employed    [place] Service/Education History  Current or Previous  Service: None    Social/Cultural History  Social History:  Male  Cultural Requests During Hospitalization: Denies  Spiritual Requests During Hospitalization: Denies  Important Activities: Social    Legal  Legal Considerations: Patient/ Family Ability to Make Healthcare Decisions  Criminal Activity/ Legal Involvement Pertinent to Current Situation/ Hospitalization: Denies  Legal Concerns: Denies    Drug Screening  Have you used any substances (canabis, cocaine, heroin, hallucinogens, inhalants, etc.) in the past 12 months?: Yes  Have you used any prescription drugs other than prescribed in the past 12 months?: Yes  Is a toxicology screen needed?: No    Stage of Change  Stage of Change: Precontemplation         Orientation  Orientation Level: Oriented X4    General Appearance  Motor Activity: Unremarkable  General Attitude: Attentive, Cooperative, Guarded, Pleasant  Appearance/Hygiene: Body odor    Thought Process  Coherency:  (unremarkable)  Content: Unremarkable  Perception: Not altered  Hallucination: None  Judgment/Insight: Poor  Confusion: None  Cognition: Appropriate judgement, Impulsive, Poor judgement    Sleep Pattern  Sleep Pattern: Unable to assess    Risk Factors  Self Harm/Suicidal Ideation Plan: Denies  Previous Self Harm/Suicidal Plans: Denies  Risk Factors: Lower socioeconomic status, Male  Description of Thoughts/Ideas Leaving Unit Now: I get depressed sometimes    Violence Risk Assessment  Assessment of Violence: None noted  Thoughts of Harm to Others: No    Ability to Assess Risk Screen  Risk Screen - Ability to Assess: Able to be screened  Ask Suicide-Screening Questions  1. In the past few weeks, have  you wished you were dead?: No  2. In the past few weeks, have you felt that you or your family would be better off if you were dead?: No  3. In the past week, have you been having thoughts about killing yourself?: No  4. Have you ever tried to kill yourself?: No  5. Are you having thoughts of killing yourself right now?: No  Calculated Risk Score: No intervention is necessary  [place] Suicide Severity Rating Scale (Screener/Recent Self-Report)  1. Wish to be Dead (Past 1 Month): No  2. Non-Specific Active Suicidal Thoughts (Past 1 Month): No  6. Suicidal Behavior (Lifetime): No  Calculated C-SSRS Risk Score (Lifetime/Recent): No Risk Indicated  Step 1: Risk Factors  Current & Past Psychiatric Dx: Recent onset, Alcohol/substance abuse disorders  Presenting Symptoms: Impulsivity  Precipitants/Stressors: Pending incarceration or homelessness  Change in Treatment: Non-compliant or not receiving treatment  Access to Lethal Methods : No  Step 2: Protective Factors   Protective Factors Internal: Identifies reasons for living, Ability to cope with stress  Protective Factors External: Supportive social network or family or friends  Step 3: Suicidal Ideation Intensity  Most Severe Suicidal Ideation Identified: Denies  Could/Can You Stop Thinking About Killing Yourself or Wanting to Die if You Want to: Does not attempt to control thoughts  Are There Things - Anyone or Anything - That Stopped You From Wanting to Die or Acting on: Does not apply  What Sort of Reasons Did You Have For Thinking About Wanting to Die or Killing Yourself: Does not apply  Step 5: Documentation  Risk Level: Low suicide risk    Psychiatric Impression and Plan of Care  Assessment and Plan:   56 y/o presents calm, cooperative, but guarded. He denies suicidal or homicidal ideation. When asked why he was in the ED, he states “ Life has been lifing”. He states using drugs and alcohol and it became too much. When asked his drug of choice he states, “I’m done  talking to you and I’m going to self”. No past psychiatric placement. He reports being homeless and wanting a sandwich.  He denies any other needs and requests to be discharged. Denies access to firearms or deadly weapons. He does not appear to be a risk to himself or others along with improvement to breathing.      Pt was processed with provider is being recommended to discharge.     Specific Resources Provided to Patient: Denied wanting referrals  CM Notified: No  PHP/IOP Recommended: No  Specific Information Provided for PHP/IOP: No  Plan Comments: Pt is being discharged    Outcome/Disposition  Patient's Perception of Outcome Achieved: Agrees with recommendation  Assessment, Recommendations and Risk Level Reviewed with: Arcelia Aldana MD  Contact Name: Not provided  Contact Number(s): -  Contact Relationship: -  EPAT Assessment Completed Date: 07/27/25  EPAT Assessment Completed Time: 0330  Patient Disposition: Home

## 2025-07-27 NOTE — PROGRESS NOTES
Emergency Medicine Transition of Care Note.    I received Ernestine Figueroa in signout from previous provider. Please see the previous ED provider note for all HPI, PE and MDM up to the time of sign-out. This is in addition to the primary record.    ED Course as of 07/28/25 2111   Sun Jul 27, 2025   0126 Patient seen by EPAT states that patient does not meet inpatient criteria.  Patient did state to EPAT nurse that he did take drugs today however will not tell her what he took states that he wants to sleep. [TS]   0646 On reevaluation patient was able to ambulate was ANO x 4 and patient was discharged. [TS]      ED Course User Index  [TS] Jessica Jacob MD         Diagnoses as of 07/28/25 2111   Drug intoxication without complication (Multi)     Medical Decision Making    Final diagnoses:   None     At the time of sign out, vital signs were as follows:  Vitals:    07/26/25 2015   BP: (!) 159/103   Pulse: 97   Resp: 20   Temp:    SpO2: 96%     In brief Ernestine Figueroa is an 55 y.o. male presenting for intoxication     Patient was signed out to me pending sober reevaluation and EPAT evaluation.      Please see ED course for further details    Dispo: Discharged    Jessica Jacob MD  Emergency Medicine, PGY-3

## 2025-07-27 NOTE — ED PROVIDER NOTES
History of Present Illness     History provided by: Patient  Limitations to History: None  External Records Reviewed with Brief Summary: Outpatient progress note from 9/9/24 which showed pcp visit for chronic condition management    HPI:  Ernestine Figueroa is a 55 y.o. male with a past medical history of HTN HLD who presents with chest pain.  The patient states that he had taken alcohol and cocaine earlier today.  Notes having chest pain in center of his chest.  He states that worsens when he stops to move around.  Patient notes that he feels like most this parting the sea.  He denies any fevers or shortness of breath.  No nausea or vomiting.  States he had both cocaine and use alcohol earlier today.  Notes he drinks alcohol every other day.  No SI or HI.  No AH or VH    Physical Exam   Triage vitals:  T 36.5 °C (97.7 °F)  HR (!) 105  BP (!) 177/106  RR 18  O2 98 % None (Room air)    General: Well appearing and in no acute distress.  HEENT: NCAT. PERRL. Oropharynx pink and moist.  CV: Regular rate, regular rhythm. No murmurs, rubs, or gallops.  Resp: Normal effort. CTAB.  GI: Soft. No tenderness to palpation. No rebound or guarding.  Extremities: No lower extremity edema. 2+ radial pulses bilaterally.  Neuro: Moving all extremities bilaterally.  Psych: Rapid speech. Holiness preoccupation.     Medical Decision Making & ED Course   Medical Decision Making:  This is a 55 y.o. male with a past medical history of HTN HLD who presents with chest pain.  Patient noting having central chest pain.  Patient does appear acutely intoxicated with a sympathomimetic.  Does admit to cocaine use.  He is religiously preoccupied.  Unclear if this is psychosis versus drug side effect.  Patient was hypertensive and tachycardic.  He was acutely intoxicated and was refusing further care.  He is not medically cleared for leaving at this time. Verbal de-escalation was attempted, however was unsuccessful.  Did require medications for  agitation with benzos as preference for his likely cocaine induced chest pain.  Patient had an EKG which was nonischemic.  Labs obtained here which showed 2 negative troponins.  CK mildly elevated likely secondary due to his cocaine use.  He is medically cleared for psychiatric evaluation.  Patient signed out pending reassessment when sober and EPAT evaluation.  ----    Differential diagnoses considered include but are not limited to: psychosis, intox, acs, pe, hyponatremia     Social Determinants of Health which Significantly Impact Care: None identified     EKG Independent Interpretation: EKG interpreted by me shows sinus tachycardia rate of 104 with a normal axis, normal intervals and no acute ischemic changes.    Independent Result Review and Interpretation: Relevant laboratory and radiographic results were reviewed and independently interpreted by myself.  As necessary, they are commented on in the ED Course.    Chronic conditions affecting the patient's care: As documented above in MDM    The patient was discussed with the following consultants/services: None    Care Considerations: As documented above in [facility]    ED Course:  ED Course as of 07/30/25 1402   Sun Jul 27, 2025   0126 Patient seen by EPAT states that patient does not meet inpatient criteria.  Patient did state to EPAT nurse that he did take drugs today however will not tell her what he took states that he wants to sleep. [TS]   0646 On reevaluation patient was able to ambulate was ANO x 4 and patient was discharged. [TS]      ED Course User Index  [TS] Jessica Jacob MD         Diagnoses as of 07/30/25 1402   Drug intoxication without complication (Multi)     Disposition   Patient was signed out to Dr. Aldana at 0100 pending completion of their work-up.  Please see the next provider's transition of care note for the remainder of the patient's care.     Procedures   Critical Care    Performed by: Larry Kelly MD  Authorized by: Larry Kelly,  MD    Critical care provider statement:     Critical care time (minutes):  30    Critical care time was exclusive of:  Separately billable procedures and treating other patients    Critical care was necessary to treat or prevent imminent or life-threatening deterioration of the following conditions:  Toxidrome and circulatory failure    Critical care was time spent personally by me on the following activities:  Blood draw for specimens, development of treatment plan with patient or surrogate, evaluation of patient's response to treatment, examination of patient, obtaining history from patient or surrogate, ordering and performing treatments and interventions, ordering and review of laboratory studies, ordering and review of radiographic studies, pulse oximetry, re-evaluation of patient's condition and review of old charts      Larry Kelly MD  Emergency Medicine     Larry Kelly MD  07/27/25 0152       Larry Kelly MD  07/30/25 1204

## 2025-07-28 ENCOUNTER — APPOINTMENT (OUTPATIENT)
Dept: RADIOLOGY | Facility: HOSPITAL | Age: 55
End: 2025-07-28
Payer: COMMERCIAL

## 2025-07-28 ENCOUNTER — CLINICAL SUPPORT (OUTPATIENT)
Dept: EMERGENCY MEDICINE | Facility: HOSPITAL | Age: 55
End: 2025-07-28
Payer: COMMERCIAL

## 2025-07-28 ENCOUNTER — HOSPITAL ENCOUNTER (OUTPATIENT)
Facility: HOSPITAL | Age: 55
Setting detail: OBSERVATION
Discharge: HOME | End: 2025-07-29
Attending: EMERGENCY MEDICINE | Admitting: EMERGENCY MEDICINE
Payer: COMMERCIAL

## 2025-07-28 DIAGNOSIS — R06.02 SHORTNESS OF BREATH: ICD-10-CM

## 2025-07-28 DIAGNOSIS — E78.49 OTHER HYPERLIPIDEMIA: ICD-10-CM

## 2025-07-28 DIAGNOSIS — R07.9 CHEST PAIN, UNSPECIFIED TYPE: Primary | ICD-10-CM

## 2025-07-28 DIAGNOSIS — S61.210A LACERATION OF RIGHT INDEX FINGER WITHOUT DAMAGE TO NAIL, FOREIGN BODY PRESENCE UNSPECIFIED, INITIAL ENCOUNTER: ICD-10-CM

## 2025-07-28 LAB
ALBUMIN SERPL BCP-MCNC: 4.5 G/DL (ref 3.4–5)
ALP SERPL-CCNC: 56 U/L (ref 33–120)
ALT SERPL W P-5'-P-CCNC: 26 U/L (ref 10–52)
ANION GAP SERPL CALC-SCNC: 14 MMOL/L (ref 10–20)
AST SERPL W P-5'-P-CCNC: 33 U/L (ref 9–39)
ATRIAL RATE: 72 BPM
BASOPHILS # BLD AUTO: 0.04 X10*3/UL (ref 0–0.1)
BASOPHILS NFR BLD AUTO: 0.5 %
BILIRUB SERPL-MCNC: 0.6 MG/DL (ref 0–1.2)
BNP SERPL-MCNC: 22 PG/ML (ref 0–99)
BUN SERPL-MCNC: 11 MG/DL (ref 6–23)
CALCIUM SERPL-MCNC: 9.2 MG/DL (ref 8.6–10.6)
CARDIAC TROPONIN I PNL SERPL HS: 14 NG/L (ref 0–53)
CHLORIDE SERPL-SCNC: 105 MMOL/L (ref 98–107)
CO2 SERPL-SCNC: 27 MMOL/L (ref 21–32)
CREAT SERPL-MCNC: 1.32 MG/DL (ref 0.5–1.3)
EGFRCR SERPLBLD CKD-EPI 2021: 64 ML/MIN/1.73M*2
EOSINOPHIL # BLD AUTO: 0.02 X10*3/UL (ref 0–0.7)
EOSINOPHIL NFR BLD AUTO: 0.2 %
ERYTHROCYTE [DISTWIDTH] IN BLOOD BY AUTOMATED COUNT: 12 % (ref 11.5–14.5)
GLUCOSE SERPL-MCNC: 87 MG/DL (ref 74–99)
HCT VFR BLD AUTO: 42.1 % (ref 41–52)
HGB BLD-MCNC: 14.8 G/DL (ref 13.5–17.5)
IMM GRANULOCYTES # BLD AUTO: 0.01 X10*3/UL (ref 0–0.7)
IMM GRANULOCYTES NFR BLD AUTO: 0.1 % (ref 0–0.9)
LYMPHOCYTES # BLD AUTO: 1.63 X10*3/UL (ref 1.2–4.8)
LYMPHOCYTES NFR BLD AUTO: 20 %
MCH RBC QN AUTO: 33.1 PG (ref 26–34)
MCHC RBC AUTO-ENTMCNC: 35.2 G/DL (ref 32–36)
MCV RBC AUTO: 94 FL (ref 80–100)
MONOCYTES # BLD AUTO: 0.66 X10*3/UL (ref 0.1–1)
MONOCYTES NFR BLD AUTO: 8.1 %
NEUTROPHILS # BLD AUTO: 5.77 X10*3/UL (ref 1.2–7.7)
NEUTROPHILS NFR BLD AUTO: 71.1 %
NRBC BLD-RTO: 0 /100 WBCS (ref 0–0)
P AXIS: 75 DEGREES
P OFFSET: 206 MS
P ONSET: 143 MS
PLATELET # BLD AUTO: 217 X10*3/UL (ref 150–450)
POTASSIUM SERPL-SCNC: 3.7 MMOL/L (ref 3.5–5.3)
PR INTERVAL: 154 MS
PROT SERPL-MCNC: 6.8 G/DL (ref 6.4–8.2)
Q ONSET: 220 MS
QRS COUNT: 12 BEATS
QRS DURATION: 80 MS
QT INTERVAL: 376 MS
QTC CALCULATION(BAZETT): 411 MS
QTC FREDERICIA: 399 MS
R AXIS: 67 DEGREES
RBC # BLD AUTO: 4.47 X10*6/UL (ref 4.5–5.9)
SODIUM SERPL-SCNC: 142 MMOL/L (ref 136–145)
T AXIS: 80 DEGREES
T OFFSET: 408 MS
VENTRICULAR RATE: 72 BPM
WBC # BLD AUTO: 8.1 X10*3/UL (ref 4.4–11.3)

## 2025-07-28 PROCEDURE — 99223 1ST HOSP IP/OBS HIGH 75: CPT

## 2025-07-28 PROCEDURE — 99285 EMERGENCY DEPT VISIT HI MDM: CPT | Performed by: EMERGENCY MEDICINE

## 2025-07-28 PROCEDURE — 93005 ELECTROCARDIOGRAM TRACING: CPT

## 2025-07-28 PROCEDURE — 93010 ELECTROCARDIOGRAM REPORT: CPT

## 2025-07-28 PROCEDURE — G0378 HOSPITAL OBSERVATION PER HR: HCPCS

## 2025-07-28 PROCEDURE — 73140 X-RAY EXAM OF FINGER(S): CPT | Mod: RT

## 2025-07-28 PROCEDURE — 36415 COLL VENOUS BLD VENIPUNCTURE: CPT

## 2025-07-28 PROCEDURE — 73140 X-RAY EXAM OF FINGER(S): CPT | Mod: RIGHT SIDE | Performed by: RADIOLOGY

## 2025-07-28 PROCEDURE — 84484 ASSAY OF TROPONIN QUANT: CPT

## 2025-07-28 PROCEDURE — 83880 ASSAY OF NATRIURETIC PEPTIDE: CPT

## 2025-07-28 PROCEDURE — 85025 COMPLETE CBC W/AUTO DIFF WBC: CPT

## 2025-07-28 PROCEDURE — 2500000001 HC RX 250 WO HCPCS SELF ADMINISTERED DRUGS (ALT 637 FOR MEDICARE OP): Mod: SE

## 2025-07-28 PROCEDURE — 80053 COMPREHEN METABOLIC PANEL: CPT

## 2025-07-28 RX ORDER — FAMOTIDINE 10 MG/ML
20 INJECTION, SOLUTION INTRAVENOUS ONCE
Status: DISCONTINUED | OUTPATIENT
Start: 2025-07-28 | End: 2025-07-29 | Stop reason: HOSPADM

## 2025-07-28 RX ORDER — GABAPENTIN 300 MG/1
600 CAPSULE ORAL 2 TIMES DAILY
Status: DISCONTINUED | OUTPATIENT
Start: 2025-07-28 | End: 2025-07-29 | Stop reason: HOSPADM

## 2025-07-28 RX ORDER — LABETALOL HYDROCHLORIDE 5 MG/ML
80 INJECTION, SOLUTION INTRAVENOUS ONCE AS NEEDED
Status: DISCONTINUED | OUTPATIENT
Start: 2025-07-28 | End: 2025-07-29 | Stop reason: HOSPADM

## 2025-07-28 RX ORDER — NITROGLYCERIN 0.4 MG/1
0.8 TABLET SUBLINGUAL ONCE
Status: COMPLETED | OUTPATIENT
Start: 2025-07-28 | End: 2025-07-29

## 2025-07-28 RX ORDER — LABETALOL HYDROCHLORIDE 5 MG/ML
40 INJECTION, SOLUTION INTRAVENOUS ONCE
Status: DISCONTINUED | OUTPATIENT
Start: 2025-07-28 | End: 2025-07-29 | Stop reason: HOSPADM

## 2025-07-28 RX ORDER — AMLODIPINE BESYLATE 5 MG/1
5 TABLET ORAL DAILY
Status: DISCONTINUED | OUTPATIENT
Start: 2025-07-29 | End: 2025-07-29 | Stop reason: HOSPADM

## 2025-07-28 RX ORDER — METFORMIN HYDROCHLORIDE 500 MG/1
500 TABLET ORAL DAILY
Status: DISCONTINUED | OUTPATIENT
Start: 2025-07-29 | End: 2025-07-29 | Stop reason: HOSPADM

## 2025-07-28 RX ORDER — ATORVASTATIN CALCIUM 40 MG/1
40 TABLET, FILM COATED ORAL NIGHTLY
Status: DISCONTINUED | OUTPATIENT
Start: 2025-07-28 | End: 2025-07-29 | Stop reason: HOSPADM

## 2025-07-28 RX ORDER — LABETALOL 200 MG/1
400 TABLET, FILM COATED ORAL ONCE
Status: DISCONTINUED | OUTPATIENT
Start: 2025-07-28 | End: 2025-07-29 | Stop reason: HOSPADM

## 2025-07-28 RX ORDER — LORAZEPAM 1 MG/1
0.5 TABLET ORAL ONCE
Status: COMPLETED | OUTPATIENT
Start: 2025-07-28 | End: 2025-07-28

## 2025-07-28 RX ADMIN — LORAZEPAM 0.5 MG: 1 TABLET ORAL at 22:48

## 2025-07-28 ASSESSMENT — PAIN DESCRIPTION - ORIENTATION: ORIENTATION: RIGHT

## 2025-07-28 ASSESSMENT — PAIN DESCRIPTION - LOCATION: LOCATION: FINGER (COMMENT WHICH ONE)

## 2025-07-28 ASSESSMENT — PAIN - FUNCTIONAL ASSESSMENT: PAIN_FUNCTIONAL_ASSESSMENT: 0-10

## 2025-07-28 ASSESSMENT — PAIN SCALES - GENERAL: PAINLEVEL_OUTOF10: 3

## 2025-07-28 NOTE — ED PROVIDER NOTES
HPI   Chief Complaint   Patient presents with    Finger Laceration       55-year-old male reported past medical history the hypertension, T2DM, HLD presents emergency department for chief complaints of finger laceration, chest pain, shortness of breath.  Patient reports chest pain, shortness of breath waxing waning over the last couple days, especially exacerbated with ambulation and movement.  Waxing and waning productive cough.  Denies flulike symptoms, fever, chills, vomiting.    Also additional complaint that he has a laceration to the right ring finger.  Reports earlier today he was breaking glass and accidentally cut his hand on the glass.  Patient states he is left-handed.  Denies any sensation deficits but does report a slight amount of pain.  Laceration overlies the PIP joint to the right ring finger.  No active bleeding.              Patient History   Medical History[1]  Surgical History[2]  Family History[3]  Social History[4]    Physical Exam   ED Triage Vitals [07/28/25 1833]   Temperature Heart Rate Respirations BP   36.8 °C (98.2 °F) 87 16 (!) 159/92      Pulse Ox Temp src Heart Rate Source Patient Position   98 % -- -- --      BP Location FiO2 (%)     -- --       Physical Exam  Vitals and nursing note reviewed.   Constitutional:       General: He is not in acute distress.     Appearance: Normal appearance. He is normal weight. He is not ill-appearing, toxic-appearing or diaphoretic.   HENT:      Head: Normocephalic.     Cardiovascular:      Rate and Rhythm: Normal rate and regular rhythm.      Heart sounds: No murmur heard.     No friction rub. No gallop.   Pulmonary:      Effort: Pulmonary effort is normal. No respiratory distress.      Breath sounds: No stridor. No wheezing, rhonchi or rales.     Musculoskeletal:      Comments: Minor approximate 0.5 cm laceration overlying the PIP joint right ring finger.  No active bleeding.  Approximates well.  Cap refill less than 2 seconds.  Sensation intact.    strength intact.     Neurological:      Mental Status: He is alert.           ED Course & MDM   ED Course as of 07/28/25 2207 Mon Jul 28, 2025 2005 Patient reported to me that he cut his finger on a piece of glass accidentally earlier today but does not believe that there were any pieces of glass stuck in his finger.  Also reports chest discomfort and shortness of breath that have been intermittent over the past approximately 3 days.  He describes this as central and left-sided and often improved when he is dancing.  Not worsened with deep inspiration.  Occasional cough productive of phlegm without hemoptysis.  Exam reassuring.  Superficial laceration without bleeding or foreign body less than 1 cm in total length to the dorsal right ring finger.  X-ray ordered however do not suspect retained foreign body.  Patient seen in the emergency department yesterday for shortness of breath and CT angio chest interpreted as suboptimal contrast bolus limiting diagnostic evaluation though no definite large central filling defect.  Given multiple cardiovascular risk factors including hypertension, hyperlipidemia, tobacco use per patient and no recent cardiac risk stratification, plan for CDU observation. [MC]      ED Course User Index  [MC] Pablo Simon MD         Diagnoses as of 07/28/25 2207   Laceration of right index finger without damage to nail, foreign body presence unspecified, initial encounter   Shortness of breath   Chest pain, unspecified type                 No data recorded     [place] Coma Scale Score: 15 (07/28/25 1834 : Sunday Traylor RN)                           Medical Decision Making  55-year-old male presents emergency department for multiple chief complaints.    First chief complaint of laceration to the right ring finger.  Reportedly occurred few hours prior to arrival.  Patient was around broken glass and accidentally sustained small laceration overlying the PIP joint of the right ring  finger.  Laceration is well-appearing on examination, approximates well.  No active bleeding.  Based on examination today I do believe that this will be able to come together well with Dermabond.  Will obtain x-ray to rule out foreign body.    Patient also has additional complaint of chest pain, shortness of breath for the last few days.  Reports waxing waning productive cough as well.  Chest pain and shortness of breath worsen with ambulation.  Patient has notable history of hypertension, hyperlipidemia, type II diabetic as well as tobacco use and positive family history of cardiac problems.  Do believe that this patient likely has an underlying component of undiagnosed COPD.  Patient was notably here in this emergency room for similar complaints yesterday, did have elevated D-dimer however, CT PE was negative.  Troponins were unremarkable as well.  Will look to keep this patient here in the emergency room for CDU as he appears to not have had echocardiogram within the past year.    Heart score of 4.    Patient's workup was reviewed here today.  CMP grossly unchanged from previous.  No significant electrolyte abnormalities noted on examination here today.  BNP, troponin within normal limits as well.  CBC negative for leukocytosis or anemia.  Repeat chest x-ray was not done given that patient had one less than 24 hours ago as well as a CT PE.      X-ray of the fingers showed no foreign body no osseous abnormality.    Given patient's significant cardiac risk factors as well as his positive family history and that he has not had echocardiogram done for over a year decision was made to offer the patient CDU admission for chest pain workup.  Patient was amenable to this plan.  Spoke with CDU provider regarding this patient and they were excepted.  Patient now admitted to CDU.          Procedure  Procedures       [1]   Past Medical History:  Diagnosis Date    DM (diabetes mellitus) (Multi)     Dyshidrosis (pompholyx)  02/20/2015    Dyshidrotic eczema    Pain in left shoulder 02/20/2015    Left shoulder pain    Sleep deprivation 02/20/2015    Sleep deficient   [2]   Past Surgical History:  Procedure Laterality Date    OTHER SURGICAL HISTORY  08/18/2015    Prior Surgical Procedure Not Done   [3] No family history on file.  [4]   Social History  Tobacco Use    Smoking status: Every Day     Current packs/day: 1.00     Average packs/day: 1 pack/day for 1.1 years (1.1 ttl pk-yrs)     Types: Cigarettes     Start date: 7/3/2024    Smokeless tobacco: Never   Substance Use Topics    Alcohol use: Not on file    Drug use: Never        Immanuel Moreno PA-C  07/29/25 0041

## 2025-07-28 NOTE — DISCHARGE INSTRUCTIONS
You had blood work did not show any heart damage.  You had a CT scan that did not show any blood clots.  You have a mild enlargement of your aorta which is the big blood vessel in your heart.  Please follow-up with the vascular surgeons for further evaluation.  Please return to the ER if you have any new or concerning symptoms.

## 2025-07-28 NOTE — ED PROVIDER NOTES
HPI   Chief Complaint   Patient presents with    Shortness of Breath       55-year-old male history of hypertension and hyperlipidemia who is presenting to the emergency department with SOB.  He states that for the past 2 days he has had shortness of breath when taking a deep breath.  No chest pain.  Has never had a PE before.  Some mild coughing.  No nasal congestion or fever.  No leg pain or swelling.  No history of PEs.  No recent travel.      History provided by:  Patient          Patient History   Medical History[1]  Surgical History[2]  Family History[3]  Social History[4]    Physical Exam   ED Triage Vitals   Temperature Heart Rate Respirations BP   07/27/25 1731 07/27/25 1731 07/27/25 1731 07/27/25 1731   36.4 °C (97.5 °F) 77 18 (!) 161/104      Pulse Ox Temp Source Heart Rate Source Patient Position   07/27/25 1731 07/27/25 1731 07/27/25 1731 07/27/25 2200   98 % Temporal Monitor Sitting      BP Location FiO2 (%)     -- --             Physical Exam  Vitals and nursing note reviewed.   Constitutional:       General: He is not in acute distress.     Appearance: Normal appearance.   HENT:      Head: Normocephalic.     Eyes:      Extraocular Movements: Extraocular movements intact.      Pupils: Pupils are equal, round, and reactive to light.       Cardiovascular:      Rate and Rhythm: Normal rate and regular rhythm.      Pulses: Normal pulses.      Heart sounds: Normal heart sounds.   Pulmonary:      Effort: Pulmonary effort is normal.      Breath sounds: Normal breath sounds.   Abdominal:      General: Abdomen is flat.      Tenderness: There is no abdominal tenderness. There is no right CVA tenderness or left CVA tenderness.     Musculoskeletal:         General: Normal range of motion.      Cervical back: Normal range of motion and neck supple.      Right lower leg: No edema.      Left lower leg: No edema.     Skin:     General: Skin is warm.      Capillary Refill: Capillary refill takes less than 2 seconds.       Findings: No bruising.     Neurological:      General: No focal deficit present.      Mental Status: He is alert and oriented to person, place, and time. Mental status is at baseline.      Sensory: No sensory deficit.      Motor: No weakness.      Coordination: Coordination normal.     Psychiatric:         Attention and Perception: Attention normal.         Thought Content: Thought content normal.      Comments: Odd affect, dancing often in the hallway but is alert and oriented and tells a linear history           ED Course & MDM   Diagnoses as of 07/30/25 0035   Shortness of breath   Aortic aneurysm without rupture, unspecified portion of aorta                 No data recorded     [place] Coma Scale Score: 15 (07/27/25 1828 : Larry Villafana RN)                           Medical Decision Making  55-year-old male who is presenting with shortness of breath when he takes a deep breath.  Chest x-ray is reassuring, troponin is negative, I did send a D-dimer which was elevated.  I obtained a CT PE which was somewhat nondiagnostic but no large filling defects.  No evidence of right heart strain on EKG or POCUS.  He has no evidence of dyspnea on exam as he has been dancing in the hallway to music on his phone for the last 2 hours without being short of breath.  He would like to leave the emergency department, I feel this is appropriate with PCP follow-up.  Discharged in stable condition.        Procedure  Procedures         [1]   Past Medical History:  Diagnosis Date    DM (diabetes mellitus) (Multi)     Dyshidrosis (pompholyx) 02/20/2015    Dyshidrotic eczema    Pain in left shoulder 02/20/2015    Left shoulder pain    Sleep deprivation 02/20/2015    Sleep deficient   [2]   Past Surgical History:  Procedure Laterality Date    OTHER SURGICAL HISTORY  08/18/2015    Prior Surgical Procedure Not Done   [3] No family history on file.  [4]   Social History  Tobacco Use    Smoking status: Every Day     Current packs/day:  1.00     Average packs/day: 1 pack/day for 1.1 years (1.1 ttl pk-yrs)     Types: Cigarettes     Start date: 7/3/2024    Smokeless tobacco: Never   Substance Use Topics    Alcohol use: Not on file    Drug use: Never        Ric Mary MD  07/30/25 4772

## 2025-07-29 ENCOUNTER — APPOINTMENT (OUTPATIENT)
Dept: RADIOLOGY | Facility: HOSPITAL | Age: 55
End: 2025-07-29
Payer: COMMERCIAL

## 2025-07-29 VITALS
HEART RATE: 55 BPM | OXYGEN SATURATION: 96 % | SYSTOLIC BLOOD PRESSURE: 135 MMHG | BODY MASS INDEX: 25.98 KG/M2 | WEIGHT: 220 LBS | HEIGHT: 77 IN | DIASTOLIC BLOOD PRESSURE: 66 MMHG | TEMPERATURE: 97.4 F | RESPIRATION RATE: 16 BRPM

## 2025-07-29 LAB
ATRIAL RATE: 97 BPM
P AXIS: 74 DEGREES
P OFFSET: 203 MS
P ONSET: 143 MS
PR INTERVAL: 152 MS
Q ONSET: 219 MS
QRS COUNT: 16 BEATS
QRS DURATION: 80 MS
QT INTERVAL: 342 MS
QTC CALCULATION(BAZETT): 434 MS
QTC FREDERICIA: 401 MS
R AXIS: 42 DEGREES
T AXIS: 65 DEGREES
T OFFSET: 390 MS
VENTRICULAR RATE: 97 BPM

## 2025-07-29 PROCEDURE — 2550000001 HC RX 255 CONTRASTS: Mod: SE | Performed by: EMERGENCY MEDICINE

## 2025-07-29 PROCEDURE — 75574 CT ANGIO HRT W/3D IMAGE: CPT | Performed by: RADIOLOGY

## 2025-07-29 PROCEDURE — 2500000001 HC RX 250 WO HCPCS SELF ADMINISTERED DRUGS (ALT 637 FOR MEDICARE OP): Mod: SE

## 2025-07-29 PROCEDURE — 99238 HOSP IP/OBS DSCHRG MGMT 30/<: CPT | Performed by: NURSE PRACTITIONER

## 2025-07-29 PROCEDURE — G0378 HOSPITAL OBSERVATION PER HR: HCPCS

## 2025-07-29 PROCEDURE — 75574 CT ANGIO HRT W/3D IMAGE: CPT

## 2025-07-29 RX ORDER — ATORVASTATIN CALCIUM 40 MG/1
40 TABLET, FILM COATED ORAL DAILY
Qty: 30 TABLET | Refills: 0 | Status: SHIPPED | OUTPATIENT
Start: 2025-07-29 | End: 2026-07-29

## 2025-07-29 RX ORDER — ASPIRIN 81 MG/1
81 TABLET ORAL DAILY
Qty: 30 TABLET | Refills: 0 | Status: SHIPPED | OUTPATIENT
Start: 2025-07-29 | End: 2026-07-29

## 2025-07-29 RX ADMIN — AMLODIPINE BESYLATE 5 MG: 5 TABLET ORAL at 08:51

## 2025-07-29 RX ADMIN — NITROGLYCERIN 0.8 MG: 0.4 TABLET SUBLINGUAL at 08:23

## 2025-07-29 RX ADMIN — ATORVASTATIN CALCIUM 40 MG: 40 TABLET, FILM COATED ORAL at 00:28

## 2025-07-29 RX ADMIN — GABAPENTIN 600 MG: 300 CAPSULE ORAL at 00:28

## 2025-07-29 RX ADMIN — IOHEXOL 90 ML: 350 INJECTION, SOLUTION INTRAVENOUS at 08:33

## 2025-07-29 RX ADMIN — GABAPENTIN 600 MG: 300 CAPSULE ORAL at 08:51

## 2025-07-29 NOTE — H&P
History and Physical  [facility] CLINICAL DECISION  Patient: Ernestine Figueroa  MRN: [id number]  : 1970  Date of Evaluation: 2025  ED Provider: Mariposa Montenegro PA-C      Limitations to history: none  Independent Historian: yes  External Records Reviewed: yes      Patient History:  Ernestine Figueroa is a 55 y.o. male with a pmh of HTN, T2DM, HLD who presents to the emergency department complaining of finger laceration, chest pain and SOB. Patient reports chest pain, shortness of breath waxing waning over the last couple days, especially exacerbated with ambulation and movement.  Waxing and waning productive cough.  Denies flulike symptoms, fever, chills, vomiting. Also, patient is complaining of a laceration to the right ring finger.  Reports earlier today he was breaking glass and accidentally cut his hand on the glass.  Patient states he is left-handed.  Denies any sensation deficits but does report a slight amount of pain.  Laceration overlies the PIP joint to the right ring finger.  No active bleeding. Pt had a CTA chest was negative for PE.    The acute evaluation in the ED included:  finger xr, CBC, CMP, Trop, BNP      Orders Placed This Encounter   Procedures    XR fingers right 2+ views    CT angio coronary art with heartflow if score >30%    CBC and Auto Differential    Comprehensive metabolic panel    Troponin I, High Sensitivity    B-type natriuretic peptide    NPO Diet Except: Other (specify); Additional Details: Clear liquids until 0500. May have clears up to 2 hours prior to procedure if exact time is known.; Effective 1000 (lunch)    Vital Signs    Notify provider    Nursing communication for Metoprolol/Labetalol dosing    ECG 12 lead    Insert and maintain peripheral IV    Initiate Observation Send to CDU       I reviewed the below labs and imaging as ordered by the ED provider:  XR fingers right 2+ views   Final Result   No acute osseous abnormality.        No radiopaque foreign  body.             MACRO:   None        Signed by: Serinalong Taylor 7/28/2025 9:27 PM   Dictation workstation:   [id number]      CT angio coronary art with heartflow if score >30%    (Results Pending)       Labs Reviewed   CBC WITH AUTO DIFFERENTIAL - Abnormal       Result Value    WBC 8.1      nRBC 0.0      RBC 4.47 (*)     Hemoglobin 14.8      Hematocrit 42.1      MCV 94      MCH 33.1      MCHC 35.2      RDW 12.0      Platelets 217      Neutrophils % 71.1      Immature Granulocytes %, Automated 0.1      Lymphocytes % 20.0      Monocytes % 8.1      Eosinophils % 0.2      Basophils % 0.5      Neutrophils Absolute 5.77      Immature Granulocytes Absolute, Automated 0.01      Lymphocytes Absolute 1.63      Monocytes Absolute 0.66      Eosinophils Absolute 0.02      Basophils Absolute 0.04     COMPREHENSIVE METABOLIC PANEL - Abnormal    Glucose 87      Sodium 142      Potassium 3.7      Chloride 105      Bicarbonate 27      Anion Gap 14      Urea Nitrogen 11      Creatinine 1.32 (*)     eGFR 64      Calcium 9.2      Albumin 4.5      Alkaline Phosphatase 56      Total Protein 6.8      AST 33      Bilirubin, Total 0.6      ALT 26     TROPONIN I, HIGH SENSITIVITY - Normal    Troponin I, High Sensitivity (CMC) 14      Narrative:     Less than 99th percentile of normal range cutoff-  Female and children under 18 years old <35 ng/L; Male <54 ng/L: Negative  Repeat testing should be performed if clinically indicated.     Female and children under 18 years old  ng/L; Male  ng/L:  Consistent with possible cardiac damage and possible increased clinical   risk. Serial measurements may help to assess extent of myocardial damage.     >120 ng/L: Consistent with cardiac damage, increased clinical risk and  myocardial infarction. Serial measurements may help assess extent of   myocardial damage.      NOTE: Children less than 1 year old may have higher baseline troponin   levels and results should be interpreted in  conjunction with the overall   clinical context.    NOTE: Troponin I testing is performed using a different   testing methodology at [facility] than at other   [facility] hospitals. Direct result comparisons should only   be made within the same method.     B-TYPE NATRIURETIC PEPTIDE - Normal    BNP 22      Narrative:        <100 pg/mL - Heart failure unlikely  100-299 pg/mL - Intermediate probability of acute heart                  failure exacerbation. Correlate with clinical                  context and patient history.    >=300 pg/mL - Heart Failure likely. Correlate with clinical                  context and patient history.     Biotin interference may cause falsely decreased results. Patients taking a Biotin dose of up to 5 mg/day should refrain from taking Biotin for 24 hours before sample  collection. Providers may contact their local laboratory for further information.           After discussion with the ED provider, a decision was made to admit the patient to the Clinical Decision Unit.    Upon admission to the Clinical Decision Unit, vital signs have been stable and patient has been resting comfortably.  During the course of the CDU stay patient has had multiple personal possessions removed by security for safekeeping.  Cardiac workup ordered including coronary CTA.  Past History   Medical History[1]  Surgical History[2]        Social History: drug use    Medications/Allergies     Previous Medications    AMLODIPINE (NORVASC) 5 MG TABLET    Take 1 tablet (5 mg) by mouth once daily.    ATORVASTATIN (LIPITOR) 40 MG TABLET    Take 1 tablet (40 mg) by mouth once daily.    GABAPENTIN (NEURONTIN) 300 MG CAPSULE    Take 1 capsule (300 mg) by mouth 3 times a day.    LISINOPRIL 5 MG TABLET    Take 1 tablet (5 mg) by mouth once daily.     Allergies[3]      Review of Systems  All systems reviewed and otherwise negative, except as stated above in HPI.      Physical Exam     Visit Vitals  BP (!) 156/93 (BP  "Location: Right arm, Patient Position: Lying)   Pulse 59   Temp 36.8 °C (98.2 °F) (Temporal)   Resp 17   Ht (!) 1.956 m (6' 5\")   Wt 99.8 kg (220 lb)   SpO2 96%   BMI 26.09 kg/m²   Smoking Status Every Day   BSA 2.33 m²       Physical Exam:    Appearance: Alert, oriented , cooperative,  in no acute distress. Well nourished & well hydrated.    Skin: Intact,  dry skin, no lesions, rash, petechiae or purpura.     Eyes: PERRLA, EOMs intact,  Conjunctiva pink with no redness or exudates. Cornea & anterior chamber are clear, Eyelids without lesions. No scleral icterus.     ENT: Hearing grossly intact. Nares patent, mucus membranes moist.  Normal phonation.    Neck: Supple, without meningismus. FROM.     Pulmonary: Good air exchange. Lungs clear bilaterally with good chest wall excursion. No rales, rhonchi or wheezing. No accessory muscle use or stridor.    Cardiac: Regular Rate and Rhythm. Normal S1, S2 without murmur, rub, gallop or extrasystole.     Abdomen: Soft, nontender, active bowel sounds.  No palpable organomegaly.  No rebound or guarding.      Musculoskeletal: Full range of motion. no pain or deformity. Pulses full and equal. No cyanosis, clubbing, or edema.     Neurological:  Cranial nerves II through XII are grossly intact,  normal sensation, no weakness, no focal findings identified.     Psychiatric: Appropriate mood and affect.       Consultants  1) none      Impression and Plan  In summary, Ernestine Figueroa is admitted to the [facility] Clinical Decision Unit for chest pain. Dr. Simon is the CDU admission attending.    This patient has been risk-stratified based on available history, physical exam, and related study findings. Admission to the observation status for further diagnosis/treatment/monitoring of chest pain is warranted clinically. This extended period of observation is specifically required to determine the need for hospitalization.     Assessment and Plan:  1.Chest " pain:  -Coronary CTA ordered  -Resolution of chest pain  -Frequent monitoring     Goal: VSS, cardiac risk stratification complete, resolution of symptoms.     When met, appropriate disposition will be arranged  Mariposa Montenegro [facility]         [1]   Past Medical History:  Diagnosis Date    DM (diabetes mellitus) (Multi)     Dyshidrosis (pompholyx) 02/20/2015    Dyshidrotic eczema    Pain in left shoulder 02/20/2015    Left shoulder pain    Sleep deprivation 02/20/2015    Sleep deficient   [2]   Past Surgical History:  Procedure Laterality Date    OTHER SURGICAL HISTORY  08/18/2015    Prior Surgical Procedure Not Done   [3]   Allergies  Allergen Reactions    Latex Itching and Rash

## 2025-07-29 NOTE — DISCHARGE SUMMARY
Disposition Note  [facility] CLINICAL DECISION  Patient: Ernestine Figueroa  MRN: [id number]  : 1970  Date of Evaluation: 2025  ED Provider: CINDY Whiteside DNP      Limitations to history: None  Independent Historian: Yes  External Records Reviewed: EMR       Subjective:    Ernestine Figueroa is a 55 y.o. male has undergone comprehensive diagnostic evaluation and therapeutic management in accordance with the CDU guidelines for chest pain. Based on the patient's clinical response and diagnostic information during this period of observation, it has been determined that the patient will be discharged.     The acute evaluation included:  Orders Placed This Encounter   Procedures    XR fingers right 2+ views    CT angio coronary art with heartflow if score >30%    CBC and Auto Differential    Comprehensive metabolic panel    Troponin I, High Sensitivity    B-type natriuretic peptide    Referral to Cardiology    Adult diet Regular, Consistent Carb; CCD 60 gm/meal    Vital Signs    Notify provider    Nursing communication for Metoprolol/Labetalol dosing    ECG 12 lead    Insert and maintain peripheral IV    Initiate Observation Send to CDU         Placed in observation at: 25        Past History   Medical History[1]  Surgical History[2]  Social History[3]      Medications/Allergies     Previous Medications    AMLODIPINE (NORVASC) 5 MG TABLET    Take 1 tablet (5 mg) by mouth once daily.    ATORVASTATIN (LIPITOR) 40 MG TABLET    Take 1 tablet (40 mg) by mouth once daily.    GABAPENTIN (NEURONTIN) 300 MG CAPSULE    Take 1 capsule (300 mg) by mouth 3 times a day.    LISINOPRIL 5 MG TABLET    Take 1 tablet (5 mg) by mouth once daily.     Allergies[4]      Review of Systems  All systems reviewed and otherwise negative, except as stated above in HPI.    Diagnostics reviewed by CINDY Whiteside DNP     Labs:  Results for orders placed or performed during the hospital encounter  of 07/28/25   CBC and Auto Differential    Collection Time: 07/28/25  8:19 PM   Result Value Ref Range    WBC 8.1 4.4 - 11.3 x10*3/uL    nRBC 0.0 0.0 - 0.0 /100 WBCs    RBC 4.47 (L) 4.50 - 5.90 x10*6/uL    Hemoglobin 14.8 13.5 - 17.5 g/dL    Hematocrit 42.1 41.0 - 52.0 %    MCV 94 80 - 100 fL    MCH 33.1 26.0 - 34.0 pg    MCHC 35.2 32.0 - 36.0 g/dL    RDW 12.0 11.5 - 14.5 %    Platelets 217 150 - 450 x10*3/uL    Neutrophils % 71.1 40.0 - 80.0 %    Immature Granulocytes %, Automated 0.1 0.0 - 0.9 %    Lymphocytes % 20.0 13.0 - 44.0 %    Monocytes % 8.1 2.0 - 10.0 %    Eosinophils % 0.2 0.0 - 6.0 %    Basophils % 0.5 0.0 - 2.0 %    Neutrophils Absolute 5.77 1.20 - 7.70 x10*3/uL    Immature Granulocytes Absolute, Automated 0.01 0.00 - 0.70 x10*3/uL    Lymphocytes Absolute 1.63 1.20 - 4.80 x10*3/uL    Monocytes Absolute 0.66 0.10 - 1.00 x10*3/uL    Eosinophils Absolute 0.02 0.00 - 0.70 x10*3/uL    Basophils Absolute 0.04 0.00 - 0.10 x10*3/uL   Comprehensive metabolic panel    Collection Time: 07/28/25  8:19 PM   Result Value Ref Range    Glucose 87 74 - 99 mg/dL    Sodium 142 136 - 145 mmol/L    Potassium 3.7 3.5 - 5.3 mmol/L    Chloride 105 98 - 107 mmol/L    Bicarbonate 27 21 - 32 mmol/L    Anion Gap 14 10 - 20 mmol/L    Urea Nitrogen 11 6 - 23 mg/dL    Creatinine 1.32 (H) 0.50 - 1.30 mg/dL    eGFR 64 >60 mL/min/1.73m*2    Calcium 9.2 8.6 - 10.6 mg/dL    Albumin 4.5 3.4 - 5.0 g/dL    Alkaline Phosphatase 56 33 - 120 U/L    Total Protein 6.8 6.4 - 8.2 g/dL    AST 33 9 - 39 U/L    Bilirubin, Total 0.6 0.0 - 1.2 mg/dL    ALT 26 10 - 52 U/L   Troponin I, High Sensitivity    Collection Time: 07/28/25  8:19 PM   Result Value Ref Range    Troponin I, High Sensitivity (CMC) 14 0 - 53 ng/L   B-type natriuretic peptide    Collection Time: 07/28/25  8:19 PM   Result Value Ref Range    BNP 22 0 - 99 pg/mL     Radiographs:  CT angio coronary art with heartflow if score >30%   Final Result   1. Mild calcification in the proximal  "LAD causing no significant   stenosis.   2. Minimal atherosclerotic calcification in the proximal RCA and   proximal PDA causing no significant stenosis.   3.   Normal coronary artery anatomy with right dominant system.   4. Mildly dilated main pulmonary artery and slightly dilated right   ventricle. Correlate with pulmonary artery hypertension.   5. Incidental note is made of small PFO.             MACRO:   None        Signed by: Leydi Ceron 7/29/2025 9:09 AM   Dictation workstation:   [id number]      XR fingers right 2+ views   Final Result   No acute osseous abnormality.        No radiopaque foreign body.             MACRO:   None        Signed by: Serina Taylor 7/28/2025 9:27 PM   Dictation workstation:   [id number]              Physical Exam     Visit Vitals  /66   Pulse 55   Temp 36.3 °C (97.4 °F)   Resp 16   Ht (!) 1.956 m (6' 5\")   Wt 99.8 kg (220 lb)   SpO2 96%   BMI 26.09 kg/m²   Smoking Status Every Day   BSA 2.33 m²       GENERAL:  The patient appears nourished and normally developed. Vital signs as documented.     HEENT:  Head normocephalic, atraumatic, EOMs intact, PERRLA, Mucous membranes moist. Nares patent without copious rhinorrhea.  No lymphadenopathy.    PULMONARY:  Lungs are clear to auscultation, without any respiratory distress. Able to speak full sentences, no accessory muscle use    CARDIAC:   Normal rate. No murmurs, rubs or gallops    ABDOMEN:  Soft, non-distended, non-tender, BS positive x 4 quadrants, No rebound or guarding, no peritoneal signs, no CVA tenderness, no masses or organomegaly    MUSCULOSKELETAL:   Able to ambulate, Non edematous, with no obvious deformities. Pulses intact distal    SKIN:   Good color, with no significant rashes.  No pallor.    NEURO:  No obvious neurological deficits, normal sensation and strength bilaterally.  Able to follow commands, NIH 0, CN 2-12 intact.    Psych: Appropriate mood and affect    Consultants  1) None       Impression " and Plan    In summary, Ernestine Figueroa has been cared for according to the standard [facility] Clinical Decision Unit observation protocol for Chest pain. This extended period of observation was specifically required to determine the need for hospitalization. Prior to discharge from observation, the final physical exam is documented above.     Significant events during the course of observation based on the goals of the clinical problem list include:   1) chest pain   Patient was admitted to the CDU for chest pain work up. Had a coronary CTA this morning which showed minimal blockages with no flow limiting lesions. They did see a small PFO and this was reviewed with Dr. Rodriguez and a referral to structural heart was placed (an appointment was made prior to leaving the CDU).  I reviewed his results as well and sent prescriptions for ASA and Lipitor to [facility].     Based on the patient's condition and test results, the patient will be discharged.     Total length of observation was 15 hours. Dr. Rodriguez is the CDU disposition attending.      Discharge Diagnosis  Chest pain  Small PFO     Issues Requiring Follow-Up  Small PFO -> structural heart referral placed     Discharge Meds     Your medication list        START taking these medications        Instructions Last Dose Given Next Dose Due   aspirin 81 mg EC tablet      Take 1 tablet (81 mg) by mouth once daily.              CHANGE how you take these medications        Instructions Last Dose Given Next Dose Due   atorvastatin 40 mg tablet  Commonly known as: Lipitor  What changed: Another medication with the same name was added. Make sure you understand how and when to take each.      Take 1 tablet (40 mg) by mouth once daily.       atorvastatin 40 mg tablet  Commonly known as: Lipitor  What changed: You were already taking a medication with the same name, and this prescription was added. Make sure you understand how and when to take each.       Take 1 tablet (40 mg) by mouth once daily.              ASK your doctor about these medications        Instructions Last Dose Given Next Dose Due   amLODIPine 5 mg tablet  Commonly known as: Norvasc      Take 1 tablet (5 mg) by mouth once daily.       gabapentin 300 mg capsule  Commonly known as: Neurontin      Take 1 capsule (300 mg) by mouth 3 times a day.       lisinopril 5 mg tablet      Take 1 tablet (5 mg) by mouth once daily.                 Where to Get Your Medications        These medications were sent to [facility]  [address], [address], [facility] [address]      Hours: 8AM to 6PM Mon-Fri, 8AM to 4PM Sat, 9AM to 1PM Sun Phone: [phone]   aspirin 81 mg EC tablet  atorvastatin 40 mg tablet         Test Results Pending At Discharge  Pending Labs       No current pending labs.          CDU COURSE:   This is a 55 year old male who was admitted to the CDU for chest pain work up after he endorsed chest pain and dyspnea.   He had a coronary CTA done this morning which showed no obstruction but a small PFO.   The result was reviewed with Dr. Rodriguez and I referred him to structural heart (the appointment was made prior to his departure). I reviewed the result with him and sent ASA and Lipitor to [facility] for him to  prior to leaving the hospital.     He also showed me small red papules on his lower legs which resembled bed bugs bites. Not really sure not itching, infected or bleeding. I did not believe that what he showed me required an urgent attention therefore he was discharged.     Outpatient Follow-Up  Future Appointments   Date Time Provider Department Center   8/25/2025  3:20 PM Jean Claude Uwamungu, MD [id number] [facility]         Yumiko Soriano, APRN-CNP, DNP                 [1]   Past Medical History:  Diagnosis Date    DM (diabetes mellitus) (Multi)     Dyshidrosis (pompholyx) 02/20/2015    Dyshidrotic eczema    Pain in left shoulder 02/20/2015    Left shoulder  pain    Sleep deprivation 02/20/2015    Sleep deficient   [2]   Past Surgical History:  Procedure Laterality Date    OTHER SURGICAL HISTORY  08/18/2015    Prior Surgical Procedure Not Done   [3]   Social History  Socioeconomic History    Marital status: Single   Tobacco Use    Smoking status: Every Day     Current packs/day: 1.00     Average packs/day: 1 pack/day for 1.1 years (1.1 ttl pk-yrs)     Types: Cigarettes     Start date: 7/3/2024    Smokeless tobacco: Never   Substance and Sexual Activity    Drug use: Never     Social Drivers of Health     Financial Resource Strain: Medium Risk (7/5/2024)    Overall Financial Resource Strain (CARDIA)     Difficulty of Paying Living Expenses: Somewhat hard   Food Insecurity: Unknown (7/10/2024)    Received from [facility]    Hunger Vital Sign     Within the past 12 months, you worried that your food would run out before you got the money to buy more.: Never true   Transportation Needs: Unmet Transportation Needs (7/5/2024)    PRAPARE - Transportation     Lack of Transportation (Medical): Yes     Lack of Transportation (Non-Medical): Yes   Physical Activity: Sufficiently Active (7/4/2024)    Exercise Vital Sign     Days of Exercise per Week: 7 days     Minutes of Exercise per Session: 30 min   Stress: Stress Concern Present (6/26/2023)    Received from [email or web address]    [place] [employer] - Occupational Stress Questionnaire     Feeling of Stress : To some extent   Social Connections: Moderately Integrated (6/26/2023)    Received from [email or web address]    Social Connection and Isolation Panel     In a typical week, how many times do you talk on the phone with family, friends, or neighbors?: More than three times a week     How often do you get together with friends or relatives?: Twice a week     How often do you attend Roman Catholic or Methodist services?: More than 4 times per year     Do you belong to any clubs or organizations such as Roman Catholic groups, unions,  fraternal or athletic groups, or school groups?: Yes     How often do you attend meetings of the clubs or organizations you belong to?: More than 4 times per year     Are you , , , , never , or living with a partner?: Never    Intimate Partner Violence: Unknown (6/26/2023)    Received from [facility]    Humiliation, Afraid, Rape, and Kick questionnaire     Within the last year, have you been afraid of your partner or ex-partner?: No     Within the last year, have you been humiliated or emotionally abused in other ways by your partner or ex-partner?: Patient declined     Within the last year, have you been kicked, hit, slapped, or otherwise physically hurt by your partner or ex-partner?: No     Within the last year, have you been raped or forced to have any kind of sexual activity by your partner or ex-partner?: No   Housing Stability: High Risk (7/5/2024)    Housing Stability Vital Sign     Unable to Pay for Housing in the Last Year: Yes     Number of Places Lived in the Last Year: 3     Unstable Housing in the Last Year: Yes   [4]   Allergies  Allergen Reactions    Latex Itching and Rash

## 2025-08-11 ENCOUNTER — TELEPHONE (OUTPATIENT)
Dept: CARDIOLOGY | Facility: HOSPITAL | Age: 55
End: 2025-08-11
Payer: MEDICARE

## 2025-08-25 ENCOUNTER — APPOINTMENT (OUTPATIENT)
Dept: CARDIOLOGY | Facility: HOSPITAL | Age: 55
End: 2025-08-25
Payer: COMMERCIAL